# Patient Record
Sex: MALE | Race: OTHER | Employment: OTHER
[De-identification: names, ages, dates, MRNs, and addresses within clinical notes are randomized per-mention and may not be internally consistent; named-entity substitution may affect disease eponyms.]

---

## 2024-03-19 ENCOUNTER — HOSPITAL ENCOUNTER (OUTPATIENT)
Facility: HOSPITAL | Age: 69
Discharge: HOME | End: 2024-03-19
Payer: MEDICARE

## 2024-03-19 VITALS
TEMPERATURE: 98 F | RESPIRATION RATE: 16 BRPM | DIASTOLIC BLOOD PRESSURE: 79 MMHG | SYSTOLIC BLOOD PRESSURE: 161 MMHG | HEART RATE: 74 BPM | BODY MASS INDEX: 27.13 KG/M2 | WEIGHT: 179 LBS | HEIGHT: 68 IN

## 2024-03-19 DIAGNOSIS — L97.522 ULCER OF LEFT FOOT, WITH FAT LAYER EXPOSED (HCC): Primary | ICD-10-CM

## 2024-03-19 PROCEDURE — 11042 DBRDMT SUBQ TIS 1ST 20SQCM/<: CPT

## 2024-03-19 PROCEDURE — 99203 OFFICE O/P NEW LOW 30 MIN: CPT

## 2024-03-19 RX ORDER — METOPROLOL TARTRATE 50 MG/1
50 TABLET, FILM COATED ORAL 2 TIMES DAILY
COMMUNITY

## 2024-03-19 RX ORDER — CLOBETASOL PROPIONATE 0.5 MG/G
OINTMENT TOPICAL ONCE
OUTPATIENT
Start: 2024-03-19 | End: 2024-03-19

## 2024-03-19 RX ORDER — ATORVASTATIN CALCIUM 40 MG/1
40 TABLET, FILM COATED ORAL NIGHTLY
COMMUNITY

## 2024-03-19 RX ORDER — LIDOCAINE HYDROCHLORIDE 20 MG/ML
JELLY TOPICAL ONCE
OUTPATIENT
Start: 2024-03-19 | End: 2024-03-19

## 2024-03-19 RX ORDER — TRIAMCINOLONE ACETONIDE 1 MG/G
OINTMENT TOPICAL ONCE
OUTPATIENT
Start: 2024-03-19 | End: 2024-03-19

## 2024-03-19 RX ORDER — GENTAMICIN SULFATE 1 MG/G
OINTMENT TOPICAL ONCE
OUTPATIENT
Start: 2024-03-19 | End: 2024-03-19

## 2024-03-19 RX ORDER — BETAMETHASONE DIPROPIONATE 0.5 MG/G
CREAM TOPICAL ONCE
OUTPATIENT
Start: 2024-03-19 | End: 2024-03-19

## 2024-03-19 RX ORDER — SODIUM CHLOR/HYPOCHLOROUS ACID 0.033 %
SOLUTION, IRRIGATION IRRIGATION ONCE
OUTPATIENT
Start: 2024-03-19 | End: 2024-03-19

## 2024-03-19 RX ORDER — LIDOCAINE 50 MG/G
OINTMENT TOPICAL ONCE
OUTPATIENT
Start: 2024-03-19 | End: 2024-03-19

## 2024-03-19 RX ORDER — LIDOCAINE HYDROCHLORIDE 40 MG/ML
SOLUTION TOPICAL ONCE
OUTPATIENT
Start: 2024-03-19 | End: 2024-03-19

## 2024-03-19 RX ORDER — POTASSIUM CHLORIDE 1.5 G/1.58G
20 POWDER, FOR SOLUTION ORAL DAILY
COMMUNITY

## 2024-03-19 RX ORDER — LISINOPRIL AND HYDROCHLOROTHIAZIDE 25; 20 MG/1; MG/1
1 TABLET ORAL DAILY
COMMUNITY

## 2024-03-19 RX ORDER — BACITRACIN ZINC AND POLYMYXIN B SULFATE 500; 1000 [USP'U]/G; [USP'U]/G
OINTMENT TOPICAL ONCE
OUTPATIENT
Start: 2024-03-19 | End: 2024-03-19

## 2024-03-19 RX ORDER — LIDOCAINE 40 MG/G
CREAM TOPICAL ONCE
OUTPATIENT
Start: 2024-03-19 | End: 2024-03-19

## 2024-03-19 RX ORDER — GINSENG 100 MG
CAPSULE ORAL ONCE
OUTPATIENT
Start: 2024-03-19 | End: 2024-03-19

## 2024-03-19 RX ORDER — IBUPROFEN 200 MG
TABLET ORAL ONCE
OUTPATIENT
Start: 2024-03-19 | End: 2024-03-19

## 2024-03-19 NOTE — FLOWSHEET NOTE
03/19/24 0914   Anesthetic   Anesthetic 4% Lidocaine Liquid Topical   Right Leg Edema Point of Measurement   Leg circumference 33.7 cm   Ankle circumference 23.5 cm   Left Leg Edema Point of Measurement   Leg circumference 34.5 cm   Ankle circumference 24 cm   RLE Neurovascular Assessment   Capillary Refill Less than/Equal to 3 seconds   Color Appropriate for Ethnicity   Temperature Warm   RLE Sensation  Full sensation   R Pedal Pulse +2   LLE Neurovascular Assessment   Capillary Refill Less than/Equal to 3 seconds   Color Appropriate for Ethnicity   Temperature Warm   LLE Sensation  Full sensation   L Pedal Pulse +2   Ankle-Brachial Index   Right Arm Systolic Pressure 161 mmHg   Left Arm Systolic Pressure 0 mmHg   Right Ankle Systolic Pressure: Posterior Tibial (PT) 0 mmHg   Left Ankle Systolic Pressure: Posterior Tibial (PT) 0 mmHg   Left Ankle Systolic Pressure: Dorsalis Pedis (DP) 102 mmHg   Left RITESH 0.63 mmHg   Wound 03/19/24 Toe (Comment  which one) Left;Plantar #1   Date First Assessed/Time First Assessed: 03/19/24 0910   Present on Original Admission: Yes  Wound Approximate Age at First Assessment (Weeks): (c)   Primary Wound Type: Other (comment)  Location: (c) Toe (Comment  which one)  Wound Location Orientati...   Wound Image    Wound Etiology Other   Wound Cleansed Cleansed with saline   Wound Length (cm) 2.1 cm   Wound Width (cm) 1.7 cm   Wound Depth (cm) 0.2 cm   Wound Surface Area (cm^2) 3.57 cm^2   Wound Volume (cm^3) 0.714 cm^3   Wound Assessment Slough;Pink/red   Drainage Amount Moderate (25-50%)   Drainage Description Serosanguinous   Odor None   Yenny-wound Assessment Maceration   Margins Flat/open edges;Undefined edges   Wound Thickness Description not for Pressure Injury Full thickness     BP (!) 161/79   Pulse 74   Temp 98 °F (36.7 °C) (Temporal)   Resp 16   Ht 1.727 m (5' 8\")   Wt 81.2 kg (179 lb)   BMI 27.22 kg/m²

## 2024-03-19 NOTE — FLOWSHEET NOTE
03/19/24 0959   Wound 03/19/24 Toe (Comment  which one) Left;Plantar #1   Date First Assessed/Time First Assessed: 03/19/24 0910   Present on Original Admission: Yes  Wound Approximate Age at First Assessment (Weeks): (c)   Primary Wound Type: Other (comment)  Location: (c) Toe (Comment  which one)  Wound Location Orientati...   Dressing Status New dressing applied   Dressing/Treatment Collagen with Ag;Alginate with Ag;Gauze dressing/dressing sponge;Tape/Soft cloth adhesive tape  (Betadine)   Offloading for Diabetic Foot Ulcers Offloading ordered     Discharge Condition: Stable    Pain: 0    Ambulatory Status: Wheelchair    Discharge Destination: home    Transportation:car    Accompanied by: FAMILY    Discharge instructions reviewed with FAMILY and copy or written instructions have been provided. All questions/concerns have been addressed at this time.

## 2024-03-19 NOTE — WOUND CARE
Wound Center  History and Physical    Subjective:     Chief Complaint:  Yunior Clement is a 69 male who presents with Lt. foot lateral wound of  months duration.    Referred by:  Dr. Head    HPI:   Wound caused by: chronic pressure/irritation due to neuropathy?  Current wound care:  Offloading wound: no  Appetite: good  Wound associated pain: none  Diabetic: -  Smoker: -  ROS: no N/V, no T/chills; no local rash  Past Medical History:   Diagnosis Date    Cerebral artery occlusion with cerebral infarction (HCC)     Hypertension       History reviewed. No pertinent surgical history.  Family History   Problem Relation Age of Onset    Heart Failure Mother       Social History     Tobacco Use    Smoking status: Never    Smokeless tobacco: Never   Substance Use Topics    Alcohol use: Yes     Comment: social       Prior to Admission medications    Medication Sig Start Date End Date Taking? Authorizing Provider   potassium chloride (KLOR-CON) 20 MEQ packet Take 20 mEq by mouth daily   Yes Isabella Riggins MD   lisinopril-hydroCHLOROthiazide (PRINZIDE;ZESTORETIC) 20-25 MG per tablet Take 1 tablet by mouth daily   Yes Isabella Riggins MD   metoprolol tartrate (LOPRESSOR) 50 MG tablet Take 1 tablet by mouth 2 times daily   Yes Isabella Riggins MD   atorvastatin (LIPITOR) 40 MG tablet Take 1 tablet by mouth nightly   Yes ProviderIsabella MD     No Known Allergies     Review of Systems:  A comprehensive review of systems was negative except for that written in the History of Present Illness.     Objective:     Physical Exam:      General: well developed, well nourished, pleasant , NAD. Hygiene good  Psych: cooperative. Pleasant. No anxiety or depression. Normal mood and affect.  Neuro: alert and oriented to person/place/situation. Otherwise nonfocal.  HEENT: Normocephalic, atraumatic. EOMI. Conjunctiva clear. No scleral icterus.  Neck: Normal range of motion. No masses.  Chest: Good air entry bilaterally.

## 2024-03-19 NOTE — PATIENT INSTRUCTIONS
Cynthia vez por semana  [ ] No cambie el vendaje  [ ] Otro:      Cambio de arcenio en el hogar:  [ ] A diario  [ ] Día por medio  [x] Vincenzo veces por semana  [ ] Cynthia vez por semana  [ ] No cambie el vendaje  [ ] Otro:      Descarga: [x] Zapato quirúrgico [ ] Botas Podus [ ] Botas de espuma [ ] Rodilla scooter [ ] Bota de yeso [ ] Bota CROW [ ] Otro:    [ ] Total sin carga de peso: [ ] Pierna derecha [ ] Pierna loganierda    [ ] Descargar cuando: [ ] caminar [ ] en la cama [ ] Sentarse       Dietético:  [x] Aumentar proteínas: ejemplos (carne, queso, huevos, yogur karyn, bebida proteica de primer nivel, pescado, nueces)  [ ] juvenil  [ ] Suplemento de bebida Protien  [] Suplementos recomendados:    Actividad:  Actividad tolerada:  [x] El paciente no tiene restricciones de actividad  [] Reposo en cama estricto:  [] Permanecer fuera del trabajo:  [ ] Regreso al trabajo con restricciones:  [ ] Puede regresar al trabajo completo:    Marva de regreso:    [x] Marva de regreso: con el Dr. Paresh Cano en 1 semana (s)    [ ] Visita de enfermera programada en día(s) o semana(s)      Wound Care Center Information: Should you experience any significant changes in your wound(s) or have questions about your wound care, please contact the [facility] at MONDAY-THURSDAY 8:00 am - 4:30 AND Friday 8:00 AM- 12:00 PM .  If you need help with your wound outside these hours and cannot wait until we are again available, contact your PCP or go to the hospital emergency room.     PLEASE NOTE: IF YOU ARE UNABLE TO OBTAIN WOUND SUPPLIES, CONTINUE TO USE THE SUPPLIES YOU HAVE AVAILABLE UNTIL YOU ARE ABLE TO REACH US. IT IS MOST IMPORTANT TO KEEP THE WOUND COVERED AT ALL TIMES.     Physician Signature:_______________________ Dr. Paresh Cano

## 2024-03-27 ENCOUNTER — HOSPITAL ENCOUNTER (OUTPATIENT)
Facility: HOSPITAL | Age: 69
Discharge: HOME | End: 2024-03-27
Payer: MEDICARE

## 2024-03-27 VITALS
SYSTOLIC BLOOD PRESSURE: 147 MMHG | RESPIRATION RATE: 18 BRPM | DIASTOLIC BLOOD PRESSURE: 69 MMHG | TEMPERATURE: 97.3 F | HEART RATE: 68 BPM

## 2024-03-27 PROCEDURE — 11042 DBRDMT SUBQ TIS 1ST 20SQCM/<: CPT

## 2024-03-27 RX ORDER — LIDOCAINE HYDROCHLORIDE 20 MG/ML
JELLY TOPICAL ONCE
OUTPATIENT
Start: 2024-03-27 | End: 2024-03-27

## 2024-03-27 RX ORDER — GENTAMICIN SULFATE 1 MG/G
OINTMENT TOPICAL ONCE
OUTPATIENT
Start: 2024-03-27 | End: 2024-03-27

## 2024-03-27 RX ORDER — IBUPROFEN 200 MG
TABLET ORAL ONCE
OUTPATIENT
Start: 2024-03-27 | End: 2024-03-27

## 2024-03-27 RX ORDER — LIDOCAINE HYDROCHLORIDE 40 MG/ML
SOLUTION TOPICAL ONCE
OUTPATIENT
Start: 2024-03-27 | End: 2024-03-27

## 2024-03-27 RX ORDER — TRIAMCINOLONE ACETONIDE 1 MG/G
OINTMENT TOPICAL ONCE
OUTPATIENT
Start: 2024-03-27 | End: 2024-03-27

## 2024-03-27 RX ORDER — BETAMETHASONE DIPROPIONATE 0.5 MG/G
CREAM TOPICAL ONCE
OUTPATIENT
Start: 2024-03-27 | End: 2024-03-27

## 2024-03-27 RX ORDER — BACITRACIN ZINC AND POLYMYXIN B SULFATE 500; 1000 [USP'U]/G; [USP'U]/G
OINTMENT TOPICAL ONCE
OUTPATIENT
Start: 2024-03-27 | End: 2024-03-27

## 2024-03-27 RX ORDER — CLOBETASOL PROPIONATE 0.5 MG/G
OINTMENT TOPICAL ONCE
OUTPATIENT
Start: 2024-03-27 | End: 2024-03-27

## 2024-03-27 RX ORDER — GINSENG 100 MG
CAPSULE ORAL ONCE
OUTPATIENT
Start: 2024-03-27 | End: 2024-03-27

## 2024-03-27 RX ORDER — LIDOCAINE 40 MG/G
CREAM TOPICAL ONCE
OUTPATIENT
Start: 2024-03-27 | End: 2024-03-27

## 2024-03-27 RX ORDER — LIDOCAINE 50 MG/G
OINTMENT TOPICAL ONCE
OUTPATIENT
Start: 2024-03-27 | End: 2024-03-27

## 2024-03-27 RX ORDER — SODIUM CHLOR/HYPOCHLOROUS ACID 0.033 %
SOLUTION, IRRIGATION IRRIGATION ONCE
OUTPATIENT
Start: 2024-03-27 | End: 2024-03-27

## 2024-03-27 NOTE — PROGRESS NOTES
03/27/24 0918   Wound Healing % 95 03/27/24 0918   Post-Procedure Length (cm) 0.9 cm 03/27/24 0945   Post-Procedure Width (cm) 0.4 cm 03/27/24 0945   Post-Procedure Depth (cm) 0.2 cm 03/27/24 0945   Post-Procedure Surface Area (cm^2) 0.36 cm^2 03/27/24 0945   Post-Procedure Volume (cm^3) 0.072 cm^3 03/27/24 0945   Wound Assessment Pink/red;Slough 03/27/24 0918   Drainage Amount Moderate (25-50%) 03/27/24 0945   Drainage Description Serosanguinous 03/27/24 0945   Odor None 03/27/24 0918   Yenny-wound Assessment Maceration;Hypopigmented 03/27/24 0918   Margins Flat/open edges 03/27/24 0918   Wound Thickness Description not for Pressure Injury Full thickness 03/27/24 0945   Number of days: 8               I have noted, and reviewed today's data for this patient in [facility] and concur with same. The focused physical exam, other physical findings, Medical history, Review of Symptoms and Medications today remains unchanged except as noted below.     Patient notes today: was seeing Dr. Cano but has had a scheduling conflict and needs to come on Wednesdays. No new trouble, wound is a little better. Gets PT at home 2-3 per week, .  Lesion/Wound, focused exam on Presentation today:   1 +pitting edema LLE, not RLE,;  RITESH of 0.6 +/- on R/L. Pulses tract to palpate.   L plantar. Ulcer at 4th  mp area now  filling with some skin ingrowth, easy bleeding in periphery with bruising.     Procedure:   Ulcer Type: pressure  Consent obtained: Yes  Time out taken: Yes    Wound,(s)# L foot.  Procedure name: sharp excisional debridement.  Anaesthesia: Lidocaine; topical    Description: using a sharp curette I excised all non viable tissue to effect a clean bleeding base. Using timolol I note good blood response  Tissue Level/depth of debridement: subq.  Post debridement dimensions changed as noted:    Depth, add 1.0 mm;    Width, add 0.0 mm   Length, add 0.0 mm.   Blood Loss: 2 CCs. Bleeding abated post treatment .   Post Procedure

## 2024-03-27 NOTE — PATIENT INSTRUCTIONS
Discharge Instructions for  [facility]  [address] [place] [address]  Telephone: [phone]   FAX [phone]    NAME:  Yunior Clement  YOB: 1955  ACCOUNT NUMBER :  [id number]  DATE:  3/27/2024       : Steve BERKOWITZ     HOME HEALTH: [facility]    Wound Cleansing:   Do not scrub or use excessive force.  Cleanse wound prior to applying a clean dressing with:      [x] Cleanse wound with: BABY SHAMPOO LATHER  LEAVE on 1-2 MINUTES ON WOUND THEN RINSE WITH WATER OR SALINE    [] May Shower at Discharge     [] Shower on days of dressing change, remove dressing first    [] Keep Wound Dry in Shower (may purchase a cast cover if needed)     Topical Treatments:  Do not apply lotions, creams, or ointments to wound bed unless directed.     [x] Apply moisturizing lotion A&D ointment  to skin surrounding the wound prior to dressing change.  [] Apply antifungal ointment to skin surrounding the wound prior to dressing change.  [] Apply thin film of Zinc barrier ointment to skin immediately around wound.  [x] Other: Timolol 1 drop to wound base, let it sit in wound for 1-2 after cleansing and before application of wound dressing      Dressings:           Wound Location Left plantar foot   Apply Primary Dressing:       [x] Collagen with Silver (1st)       [x] Alginate with Silver (2nd)        [] Other:        Cover and Secure with:    [x] Gauze   [] Arti   [] Kerlix  [] Ace Wrap     [] ABD  [x] Medipore tape  [] tape  [] Other:    Avoid contact of tape with skin.    Change dressing:   [] Daily      [] Every Other Day   [x] Three times per week  [] Once a week   [] Do Not Change Dressing     [] Other:      Home Health change  :   [] Daily      [] Every Other Day   [x] Three times per week  [] Once a week   [] Do Not Change Dressing     [] Other:      Off-Loading: [x] Surgical shoe    [] Podus Boot(s)   [] Foam Boot(s)  [] Knee scooter [] Cast Boot [] CROW Boot  []

## 2024-03-27 NOTE — FLOWSHEET NOTE
03/27/24 0918   Anesthetic   Anesthetic 4% Lidocaine Liquid Topical   Left Leg Edema Point of Measurement   Leg circumference 32.8 cm   Ankle circumference 22 cm   LLE Neurovascular Assessment   Capillary Refill Less than/Equal to 3 seconds   Temperature Warm   L Pedal Pulse +2   Wound 03/19/24 Toe (Comment  which one) Left;Plantar #1   Date First Assessed/Time First Assessed: 03/19/24 0910   Present on Original Admission: Yes  Wound Approximate Age at First Assessment (Weeks): (c)   Primary Wound Type: Other (comment)  Location: (c) Toe (Comment  which one)  Wound Location Orientati...   Wound Image    Wound Length (cm) 0.9 cm   Wound Width (cm) 0.4 cm   Wound Depth (cm) 0.1 cm   Wound Surface Area (cm^2) 0.36 cm^2   Change in Wound Size % (l*w) 89.92   Wound Volume (cm^3) 0.036 cm^3   Wound Healing % 95   Wound Assessment [place]/red;Slough   Drainage Amount Small (< 25%)   Drainage Description Serosanguinous   Odor None   Yenny-wound Assessment Maceration;Hypopigmented   Margins Flat/open edges     BP (!) 147/69   Pulse 68   Temp 97.3 °F (36.3 °C) (Temporal)   Resp 18

## 2024-03-27 NOTE — FLOWSHEET NOTE
03/27/24 1002   Wound 03/19/24 Toe (Comment  which one) Left;Plantar #1   Date First Assessed/Time First Assessed: 03/19/24 0910   Present on Original Admission: Yes  Wound Approximate Age at First Assessment (Weeks): (c)   Primary Wound Type: Other (comment)  Location: (c) Toe (Comment  which one)  Wound Location Orientati...   Dressing/Treatment Collagen with Ag;Alginate with Ag;Gauze dressing/dressing sponge;Tape/Soft cloth adhesive tape;Other (comment)  (Timolol drops)     Discharge Condition: Stable    Pain: 0    Ambulatory Status: Wheelchair    Discharge Destination: home    Transportation:car    Accompanied by: FAMILY    Discharge instructions reviewed with FAMILY and self and copy or written instructions have been provided. All questions/concerns have been addressed at this time.

## 2024-03-28 NOTE — WOUND CARE
Spoke to Erin at [facility] and requested most recent labs and chart notes to be faxed to wound care clinic.

## 2024-04-03 ENCOUNTER — HOSPITAL ENCOUNTER (OUTPATIENT)
Facility: HOSPITAL | Age: 69
Discharge: HOME | End: 2024-04-03
Payer: MEDICARE

## 2024-04-03 VITALS
HEART RATE: 69 BPM | TEMPERATURE: 97.5 F | DIASTOLIC BLOOD PRESSURE: 76 MMHG | SYSTOLIC BLOOD PRESSURE: 132 MMHG | RESPIRATION RATE: 16 BRPM

## 2024-04-03 DIAGNOSIS — L97.522 ULCER OF LEFT FOOT, WITH FAT LAYER EXPOSED (HCC): Primary | ICD-10-CM

## 2024-04-03 PROCEDURE — 11042 DBRDMT SUBQ TIS 1ST 20SQCM/<: CPT

## 2024-04-03 RX ORDER — LIDOCAINE HYDROCHLORIDE 20 MG/ML
JELLY TOPICAL ONCE
OUTPATIENT
Start: 2024-04-03 | End: 2024-04-03

## 2024-04-03 RX ORDER — LIDOCAINE 40 MG/G
CREAM TOPICAL ONCE
OUTPATIENT
Start: 2024-04-03 | End: 2024-04-03

## 2024-04-03 RX ORDER — GENTAMICIN SULFATE 1 MG/G
OINTMENT TOPICAL ONCE
OUTPATIENT
Start: 2024-04-03 | End: 2024-04-03

## 2024-04-03 RX ORDER — IBUPROFEN 200 MG
TABLET ORAL ONCE
OUTPATIENT
Start: 2024-04-03 | End: 2024-04-03

## 2024-04-03 RX ORDER — BETAMETHASONE DIPROPIONATE 0.5 MG/G
CREAM TOPICAL ONCE
OUTPATIENT
Start: 2024-04-03 | End: 2024-04-03

## 2024-04-03 RX ORDER — BACITRACIN ZINC AND POLYMYXIN B SULFATE 500; 1000 [USP'U]/G; [USP'U]/G
OINTMENT TOPICAL ONCE
OUTPATIENT
Start: 2024-04-03 | End: 2024-04-03

## 2024-04-03 RX ORDER — SODIUM CHLOR/HYPOCHLOROUS ACID 0.033 %
SOLUTION, IRRIGATION IRRIGATION ONCE
OUTPATIENT
Start: 2024-04-03 | End: 2024-04-03

## 2024-04-03 RX ORDER — CLOBETASOL PROPIONATE 0.5 MG/G
OINTMENT TOPICAL ONCE
OUTPATIENT
Start: 2024-04-03 | End: 2024-04-03

## 2024-04-03 RX ORDER — GINSENG 100 MG
CAPSULE ORAL ONCE
OUTPATIENT
Start: 2024-04-03 | End: 2024-04-03

## 2024-04-03 RX ORDER — LIDOCAINE HYDROCHLORIDE 40 MG/ML
SOLUTION TOPICAL ONCE
OUTPATIENT
Start: 2024-04-03 | End: 2024-04-03

## 2024-04-03 RX ORDER — TRIAMCINOLONE ACETONIDE 1 MG/G
OINTMENT TOPICAL ONCE
OUTPATIENT
Start: 2024-04-03 | End: 2024-04-03

## 2024-04-03 RX ORDER — LIDOCAINE 50 MG/G
OINTMENT TOPICAL ONCE
OUTPATIENT
Start: 2024-04-03 | End: 2024-04-03

## 2024-04-03 NOTE — FLOWSHEET NOTE
04/03/24 1102   Wound 03/19/24 Toe (Comment  which one) Left;Plantar #1   Date First Assessed/Time First Assessed: 03/19/24 0910   Present on Original Admission: Yes  Wound Approximate Age at First Assessment (Weeks): (c)   Primary Wound Type: Other (comment)  Location: (c) Toe (Comment  which one)  Wound Location Orientati...   Dressing/Treatment Alginate with Ag;Gauze dressing/dressing sponge;Tape/Soft cloth adhesive tape   Offloading for Diabetic Foot Ulcers Offloading ordered     Discharge Condition: Stable    Pain: 0    Ambulatory Status:Wheelchair    Discharge Destination: Home    Transportation:Car    Accompanied by: Family    Discharge instructions reviewed with Family and copy or written instructions have been provided. All questions/concerns have been addressed at this time.

## 2024-04-03 NOTE — FLOWSHEET NOTE
04/03/24 1009   Anesthetic   Anesthetic 4% Lidocaine Liquid Topical   Left Leg Edema Point of Measurement   Leg circumference 32.6 cm   Ankle circumference 22.7 cm   LLE Neurovascular Assessment   Capillary Refill Greater than 3 seconds   Color Appropriate for Ethnicity   Temperature Warm   L Pedal Pulse +1   Wound 03/19/24 Toe (Comment  which one) Left;Plantar #1   Date First Assessed/Time First Assessed: 03/19/24 0910   Present on Original Admission: Yes  Wound Approximate Age at First Assessment (Weeks): (c)   Primary Wound Type: Other (comment)  Location: (c) Toe (Comment  which one)  Wound Location Orientati...   Wound Image    Wound Cleansed Cleansed with saline   Offloading for Diabetic Foot Ulcers Offloading ordered   Wound Length (cm) 2 cm   Wound Width (cm) 1.3 cm   Wound Depth (cm) 0.1 cm   Wound Surface Area (cm^2) 2.6 cm^2   Change in Wound Size % (l*w) 27.17   Wound Volume (cm^3) 0.26 cm^3   Wound Healing % 64   Wound Assessment Pink/red   Drainage Amount Moderate (25-50%)   Drainage Description Serosanguinous   Odor None   Yenny-wound Assessment Maceration;Hyperkeratosis (callous)   Margins Undefined edges;Flat/open edges   Wound Thickness Description not for Pressure Injury Full thickness     /76   Pulse 69   Temp 97.5 °F (36.4 °C) (Temporal)   Resp 16

## 2024-04-03 NOTE — PROGRESS NOTES
[facility]   Progress Note and Procedure Note   NO Procedure    Patient Name: Yunior Clement  : 1955  MRN: [id number]    Past Medical History:   Diagnosis Date    Cerebral artery occlusion with cerebral infarction (HCC)     Hypertension      No past surgical history on file.  Family History   Problem Relation Age of Onset    Heart Failure Mother      Social History     Tobacco Use    Smoking status: Never    Smokeless tobacco: Never   Substance Use Topics    Alcohol use: Yes     Comment: social    Drug use: Never     No Known Allergies  Current Outpatient Medications on File Prior to Encounter   Medication Sig Dispense Refill    potassium chloride (KLOR-CON) 20 MEQ packet Take 20 mEq by mouth daily      lisinopril-hydroCHLOROthiazide (PRINZIDE;ZESTORETIC) 20-25 MG per tablet Take 1 tablet by mouth daily      metoprolol tartrate (LOPRESSOR) 50 MG tablet Take 1 tablet by mouth 2 times daily      atorvastatin (LIPITOR) 40 MG tablet Take 1 tablet by mouth nightly       No current facility-administered medications on file prior to encounter.     No results found for: \"LABA1C\"      Vitals:    24 1012   BP: 132/76   Pulse: 69   Resp: 16   Temp: 97.5 °F (36.4 °C)      Wound 24 Toe (Comment  which one) Left;Plantar #1 (Active)   Wound Image   24 1009   Wound Etiology Pressure Unstageable 24 0923   Dressing Status New dressing applied 24 0959   Wound Cleansed Cleansed with saline 24 1009   Dressing/Treatment Alginate with Ag;Gauze dressing/dressing sponge;Tape/Soft cloth adhesive tape 24 1102   Offloading for Diabetic Foot Ulcers Offloading ordered 24 1102   Wound Length (cm) 2 cm 24 1009   Wound Width (cm) 1.3 cm 24 1009   Wound Depth (cm) 0.1 cm 24 1009   Wound Surface Area (cm^2) 2.6 cm^2 24 1009   Change in Wound Size % (l*w) 27.17 24 1009   Wound Volume (cm^3) 0.26 cm^3 24 1009   Wound Healing % 64 24

## 2024-04-03 NOTE — PATIENT INSTRUCTIONS
Discharge Instructions for  [facility]  [address] [place] [address]  Telephone: [phone]   FAX [phone]    NAME:  Yunior Clement  YOB: 1955  ACCOUNT NUMBER :  [id number]  DATE:  4/3/2024       : Steve BERKOWITZ     HOME HEALTH: [facility]    Wound Cleansing:   Do not scrub or use excessive force.  Cleanse wound prior to applying a clean dressing with:      [x] Cleanse wound with: BABY SHAMPOO LATHER  LEAVE on 1-2 MINUTES ON WOUND THEN RINSE WITH WATER OR SALINE    [] May Shower at Discharge     [] Shower on days of dressing change, remove dressing first    [] Keep Wound Dry in Shower (may purchase a cast cover if needed)     Topical Treatments:  Do not apply lotions, creams, or ointments to wound bed unless directed.     [x] Apply moisturizing lotion A&D ointment  to skin surrounding the wound prior to dressing change.  [] Apply antifungal ointment to skin surrounding the wound prior to dressing change.  [] Apply thin film of Zinc barrier ointment to skin immediately around wound.  [] Other:    Dressings:           Wound Location Left plantar foot   Apply Primary Dressing:      [x] Alginate with Silver         [] Other:        Cover and Secure with:    [x] Gauze   [] Arti   [] Kerlix  [] Ace Wrap     [] ABD  [x] Medipore tape  [] tape  [] Other:    Avoid contact of tape with skin.    Change dressing:   [] Daily      [] Every Other Day   [x] Three times per week  [] Once a week   [] Do Not Change Dressing     [] Other:      Home Health change  :   [] Daily      [] Every Other Day   [x] Three times per week  [] Once a week   [] Do Not Change Dressing     [] Other:      Off-Loading: [x] Surgical shoe (wound clinic to add felt to surgical shoe for offloading)   [] Podus Boot(s)   [] Foam Boot(s)  [] Knee scooter [] Cast Boot [] CROW Boot  [] Other:    [] Total non-weight bearing : [] Right Leg     [] Left Leg    [] Off-loading when : [] walking  [] in

## 2024-04-17 ENCOUNTER — HOSPITAL ENCOUNTER (OUTPATIENT)
Facility: HOSPITAL | Age: 69
Discharge: HOME | End: 2024-04-17
Payer: MEDICARE

## 2024-04-17 VITALS
SYSTOLIC BLOOD PRESSURE: 157 MMHG | RESPIRATION RATE: 16 BRPM | HEART RATE: 67 BPM | DIASTOLIC BLOOD PRESSURE: 83 MMHG | TEMPERATURE: 98.1 F

## 2024-04-17 DIAGNOSIS — L97.522 ULCER OF LEFT FOOT, WITH FAT LAYER EXPOSED (HCC): Primary | ICD-10-CM

## 2024-04-17 PROCEDURE — 11042 DBRDMT SUBQ TIS 1ST 20SQCM/<: CPT

## 2024-04-17 RX ORDER — SODIUM CHLOR/HYPOCHLOROUS ACID 0.033 %
SOLUTION, IRRIGATION IRRIGATION ONCE
OUTPATIENT
Start: 2024-04-17 | End: 2024-04-17

## 2024-04-17 RX ORDER — TRIAMCINOLONE ACETONIDE 1 MG/G
OINTMENT TOPICAL ONCE
OUTPATIENT
Start: 2024-04-17 | End: 2024-04-17

## 2024-04-17 RX ORDER — BETAMETHASONE DIPROPIONATE 0.5 MG/G
CREAM TOPICAL ONCE
OUTPATIENT
Start: 2024-04-17 | End: 2024-04-17

## 2024-04-17 RX ORDER — GENTAMICIN SULFATE 1 MG/G
OINTMENT TOPICAL ONCE
OUTPATIENT
Start: 2024-04-17 | End: 2024-04-17

## 2024-04-17 RX ORDER — LIDOCAINE 50 MG/G
OINTMENT TOPICAL ONCE
OUTPATIENT
Start: 2024-04-17 | End: 2024-04-17

## 2024-04-17 RX ORDER — IBUPROFEN 200 MG
TABLET ORAL ONCE
OUTPATIENT
Start: 2024-04-17 | End: 2024-04-17

## 2024-04-17 RX ORDER — LIDOCAINE HYDROCHLORIDE 20 MG/ML
JELLY TOPICAL ONCE
OUTPATIENT
Start: 2024-04-17 | End: 2024-04-17

## 2024-04-17 RX ORDER — BACITRACIN ZINC AND POLYMYXIN B SULFATE 500; 1000 [USP'U]/G; [USP'U]/G
OINTMENT TOPICAL ONCE
OUTPATIENT
Start: 2024-04-17 | End: 2024-04-17

## 2024-04-17 RX ORDER — GINSENG 100 MG
CAPSULE ORAL ONCE
OUTPATIENT
Start: 2024-04-17 | End: 2024-04-17

## 2024-04-17 RX ORDER — LIDOCAINE HYDROCHLORIDE 40 MG/ML
SOLUTION TOPICAL ONCE
OUTPATIENT
Start: 2024-04-17 | End: 2024-04-17

## 2024-04-17 RX ORDER — LIDOCAINE 40 MG/G
CREAM TOPICAL ONCE
OUTPATIENT
Start: 2024-04-17 | End: 2024-04-17

## 2024-04-17 RX ORDER — CLOBETASOL PROPIONATE 0.5 MG/G
OINTMENT TOPICAL ONCE
OUTPATIENT
Start: 2024-04-17 | End: 2024-04-17

## 2024-04-17 NOTE — FLOWSHEET NOTE
04/17/24 1045   Wound 03/19/24 Toe (Comment  which one) Left;Plantar #1   Date First Assessed/Time First Assessed: 03/19/24 0910   Present on Original Admission: Yes  Wound Approximate Age at First Assessment (Weeks): (c)   Primary Wound Type: Other (comment)  Location: (c) Toe (Comment  which one)  Wound Location Orientati...   Dressing/Treatment Cutimed/Sorbact;Alginate with Ag;Gauze dressing/dressing sponge;Tape/Soft cloth adhesive tape     Discharge Condition: Stable    Pain: 0    Ambulatory Status:Wheelchair    Discharge Destination: Home    Transportation:Car    Accompanied by: Self and Family    Discharge instructions reviewed with Self and Family and copy or written instructions have been provided. All questions/concerns have been addressed at this time.

## 2024-04-17 NOTE — PROGRESS NOTES
[facility]   Progress Note and Procedure Note   NO Procedure    Patient Name: Yunior Clement  : 1955  MRN: [id number]    Past Medical History:   Diagnosis Date    Cerebral artery occlusion with cerebral infarction (HCC)     Hypertension      History reviewed. No pertinent surgical history.  Family History   Problem Relation Age of Onset    Heart Failure Mother      Social History     Tobacco Use    Smoking status: Never    Smokeless tobacco: Never   Substance Use Topics    Alcohol use: Yes     Comment: social    Drug use: Never     No Known Allergies  Current Outpatient Medications on File Prior to Encounter   Medication Sig Dispense Refill    potassium chloride (KLOR-CON) 20 MEQ packet Take 20 mEq by mouth daily      lisinopril-hydroCHLOROthiazide (PRINZIDE;ZESTORETIC) 20-25 MG per tablet Take 1 tablet by mouth daily      metoprolol tartrate (LOPRESSOR) 50 MG tablet Take 1 tablet by mouth 2 times daily      atorvastatin (LIPITOR) 40 MG tablet Take 1 tablet by mouth nightly       No current facility-administered medications on file prior to encounter.     No results found for: \"LABA1C\"      Vitals:    24 1016   BP: (!) 157/83   Pulse: 67   Resp: 16   Temp: 98.1 °F (36.7 °C)      Wound 24 Toe (Comment  which one) Left;Plantar #1 (Active)   Wound Image   24 1016   Wound Etiology Pressure Unstageable 24 0923   Dressing Status New dressing applied 24 0959   Wound Cleansed Cleansed with saline 24 1016   Dressing/Treatment Cutimed/Sorbact;Alginate with Ag;Gauze dressing/dressing sponge;Tape/Soft cloth adhesive tape 24 1045   Offloading for Diabetic Foot Ulcers Offloading ordered 24 1102   Wound Length (cm) 0.6 cm 24 1016   Wound Width (cm) 0.9 cm 24 1016   Wound Depth (cm) 0.1 cm 24 1016   Wound Surface Area (cm^2) 0.54 cm^2 24 1016   Change in Wound Size % (l*w) 84.87 24 1016   Wound Volume (cm^3) 0.054 cm^3

## 2024-04-17 NOTE — FLOWSHEET NOTE
04/17/24 1016   Anesthetic   Anesthetic 4% Lidocaine Liquid Topical   Left Leg Edema Point of Measurement   Leg circumference 30.5 cm   Ankle circumference 23.5 cm   LLE Neurovascular Assessment   Capillary Refill Less than/Equal to 3 seconds   Color Appropriate for Ethnicity   Temperature Warm   L Pedal Pulse +1   Wound 03/19/24 Toe (Comment  which one) Left;Plantar #1   Date First Assessed/Time First Assessed: 03/19/24 0910   Present on Original Admission: Yes  Wound Approximate Age at First Assessment (Weeks): (c)   Primary Wound Type: Other (comment)  Location: (c) Toe (Comment  which one)  Wound Location Orientati...   Wound Image    Wound Cleansed Cleansed with saline   Wound Length (cm) 0.6 cm  (clustered)   Wound Width (cm) 0.9 cm  (clustered)   Wound Depth (cm) 0.1 cm   Wound Surface Area (cm^2) 0.54 cm^2   Change in Wound Size % (l*w) 84.87   Wound Volume (cm^3) 0.054 cm^3   Wound Healing % 92   Wound Assessment Slough;Pink/red;Epithelialization   Drainage Amount Moderate (25-50%)   Drainage Description Serosanguinous   Odor None   Yenny-wound Assessment Other (Comment);Dry/flaky   Margins Flat/open edges   Wound Thickness Description not for Pressure Injury Partial thickness   Pain Assessment   Pain Assessment None - Denies Pain     BP (!) 157/83   Pulse 67   Temp 98.1 °F (36.7 °C) (Temporal)   Resp 16

## 2024-04-17 NOTE — PATIENT INSTRUCTIONS
Discharge Instructions for  [facility]  [address] [place] [address]  Telephone: [phone]   FAX [phone]    NAME:  Yunior Clement  YOB: 1955  ACCOUNT NUMBER :  [id number]  DATE:  4/17/2024       : Steve BERKOWITZ     HOME HEALTH: [facility]    Wound Cleansing:   Do not scrub or use excessive force.  Cleanse wound prior to applying a clean dressing with:      [x] Cleanse wound with: BABY SHAMPOO LATHER  LEAVE on 1-2 MINUTES ON WOUND THEN RINSE WITH WATER OR SALINE    [] May Shower at Discharge     [] Shower on days of dressing change, remove dressing first    [] Keep Wound Dry in Shower (may purchase a cast cover if needed)     Topical Treatments:  Do not apply lotions, creams, or ointments to wound bed unless directed.     [x] Apply moisturizing lotion A&D ointment  to skin surrounding the wound prior to dressing change.  [] Apply antifungal ointment to skin surrounding the wound prior to dressing change.  [] Apply thin film of Zinc barrier ointment to skin immediately around wound.  [x] Other: Timolol 1-2 drops to wound base after cleansing and let sit 1 minute then apply dressing      Dressings:           Wound Location Left plantar foot   Apply Primary Dressing:      [x] Sorbact to wound base then cover with Alginate with Silver         [] Other:        Cover and Secure with:    [x] Gauze   [] Arti   [] Kerlix  [] Ace Wrap     [] ABD  [x] Medipore tape  [] tape  [] Other:    Avoid contact of tape with skin.    Change dressing:   [] Daily      [] Every Other Day   [x] Three times per week  [] Once a week   [] Do Not Change Dressing     [] Other:      Home Health change  :   [] Daily      [] Every Other Day   [x] Three times per week  [] Once a week   [] Do Not Change Dressing     [] Other:      Off-Loading: [x] Surgical shoe (wound clinic to add felt to surgical shoe for offloading)   [] Podus Boot(s)   [] Foam Boot(s)  [] Knee scooter [] Cast

## 2024-05-01 ENCOUNTER — HOSPITAL ENCOUNTER (OUTPATIENT)
Facility: HOSPITAL | Age: 69
Discharge: HOME | End: 2024-05-01
Payer: MEDICARE

## 2024-05-01 VITALS
SYSTOLIC BLOOD PRESSURE: 134 MMHG | DIASTOLIC BLOOD PRESSURE: 72 MMHG | HEART RATE: 67 BPM | TEMPERATURE: 98 F | RESPIRATION RATE: 15 BRPM

## 2024-05-01 DIAGNOSIS — L97.522 ULCER OF LEFT FOOT, WITH FAT LAYER EXPOSED (HCC): Primary | ICD-10-CM

## 2024-05-01 PROCEDURE — 11042 DBRDMT SUBQ TIS 1ST 20SQCM/<: CPT

## 2024-05-01 RX ORDER — CLOBETASOL PROPIONATE 0.5 MG/G
OINTMENT TOPICAL ONCE
OUTPATIENT
Start: 2024-05-01 | End: 2024-05-01

## 2024-05-01 RX ORDER — BACITRACIN ZINC AND POLYMYXIN B SULFATE 500; 1000 [USP'U]/G; [USP'U]/G
OINTMENT TOPICAL ONCE
OUTPATIENT
Start: 2024-05-01 | End: 2024-05-01

## 2024-05-01 RX ORDER — TRIAMCINOLONE ACETONIDE 1 MG/G
OINTMENT TOPICAL ONCE
OUTPATIENT
Start: 2024-05-01 | End: 2024-05-01

## 2024-05-01 RX ORDER — IBUPROFEN 200 MG
TABLET ORAL ONCE
OUTPATIENT
Start: 2024-05-01 | End: 2024-05-01

## 2024-05-01 RX ORDER — LIDOCAINE HYDROCHLORIDE 20 MG/ML
JELLY TOPICAL ONCE
OUTPATIENT
Start: 2024-05-01 | End: 2024-05-01

## 2024-05-01 RX ORDER — GINSENG 100 MG
CAPSULE ORAL ONCE
OUTPATIENT
Start: 2024-05-01 | End: 2024-05-01

## 2024-05-01 RX ORDER — SODIUM CHLOR/HYPOCHLOROUS ACID 0.033 %
SOLUTION, IRRIGATION IRRIGATION ONCE
OUTPATIENT
Start: 2024-05-01 | End: 2024-05-01

## 2024-05-01 RX ORDER — GENTAMICIN SULFATE 1 MG/G
OINTMENT TOPICAL ONCE
OUTPATIENT
Start: 2024-05-01 | End: 2024-05-01

## 2024-05-01 RX ORDER — BETAMETHASONE DIPROPIONATE 0.5 MG/G
CREAM TOPICAL ONCE
OUTPATIENT
Start: 2024-05-01 | End: 2024-05-01

## 2024-05-01 RX ORDER — LIDOCAINE 50 MG/G
OINTMENT TOPICAL ONCE
OUTPATIENT
Start: 2024-05-01 | End: 2024-05-01

## 2024-05-01 RX ORDER — LIDOCAINE 40 MG/G
CREAM TOPICAL ONCE
OUTPATIENT
Start: 2024-05-01 | End: 2024-05-01

## 2024-05-01 RX ORDER — LIDOCAINE HYDROCHLORIDE 40 MG/ML
SOLUTION TOPICAL ONCE
OUTPATIENT
Start: 2024-05-01 | End: 2024-05-01

## 2024-05-01 NOTE — PATIENT INSTRUCTIONS
Discharge Instructions for  [facility]  [address] [place] [address]  Telephone: [phone]   FAX [phone]    NAME:  Yunior Clement  YOB: 1955  ACCOUNT NUMBER :  [id number]  DATE:  4/17/2024       : Steve BERKOWITZ     HOME HEALTH: [facility]    Referral: The physician has recommended vascular studies with Dr. iNcolas John's office:  Studies to be completed: Ankle Brachial Index Bilateral lower legs and Venous Reflux bilateral lower legs     Dr. John's office should reach out to patient within 24 to 48 hours with information on appointment time and date  If a call has not been made within 24 to 48 hours please contact Dr. John's office at [phone] (Fax:[phone])  Be prepared for possible wound dressing removal; if compression wrap is in place please make a nurse visit appointment with the wound care center [phone] or coordinate with home health agency to re-apply compression wrap after vascular visit.  Nicolas John  Physician  Provider Summary    Title Resident? Provider type   MD No Physician     Primary Contact Information    Phone Fax E-mail Address   [phone] Not available [id number] [facility] [address]       Specialties      General Surgery, Vascular Surgery                 Wound Cleansing:   Do not scrub or use excessive force.  Cleanse wound prior to applying a clean dressing with:      [x] Cleanse wound with: BABY SHAMPOO LATHER  LEAVE on 1-2 MINUTES ON WOUND THEN RINSE WITH WATER OR SALINE    [] May Shower at Discharge     [] Shower on days of dressing change, remove dressing first    [] Keep Wound Dry in Shower (may purchase a cast cover if needed)     Topical Treatments:  Do not apply lotions, creams, or ointments to wound bed unless directed.     [x] Apply moisturizing lotion A&D ointment  to skin surrounding the wound prior to dressing change.  [] Apply antifungal ointment to skin

## 2024-05-01 NOTE — FLOWSHEET NOTE
05/01/24 0955   Wound 03/19/24 Toe (Comment  which one) Left;Plantar #1   Date First Assessed/Time First Assessed: 03/19/24 0910   Present on Original Admission: Yes  Wound Approximate Age at First Assessment (Weeks): (c)   Primary Wound Type: Other (comment)  Location: (c) Toe (Comment  which one)  Wound Location Orientati...   Dressing/Treatment Alginate with Ag;Gauze dressing/dressing sponge;ABD;Tape/Soft cloth adhesive tape;Roll gauze   Offloading for Diabetic Foot Ulcers Offloading ordered        05/01/24 0955   Wound 03/19/24 Toe (Comment  which one) Left;Plantar #1   Date First Assessed/Time First Assessed: 03/19/24 0910   Present on Original Admission: Yes  Wound Approximate Age at First Assessment (Weeks): (c)   Primary Wound Type: Other (comment)  Location: (c) Toe (Comment  which one)  Wound Location Orientati...   Dressing/Treatment Alginate with Ag;Gauze dressing/dressing sponge;ABD;Tape/Soft cloth adhesive tape;Roll gauze   Offloading for Diabetic Foot Ulcers Offloading ordered     Discharge Condition: Stable    Pain: 0    Ambulatory Status:Wheelchair    Discharge Destination: Home    Transportation:Car    Accompanied by: Family    Discharge instructions reviewed with Family and copy or written instructions have been provided. All questions/concerns have been addressed at this time.

## 2024-05-01 NOTE — PROGRESS NOTES
[facility]   Progress Note and Procedure Note   NO Procedure    Patient Name: Yunior Clement  : 1955  MRN: [id number]    Past Medical History:   Diagnosis Date    Cerebral artery occlusion with cerebral infarction (HCC)     Hypertension      History reviewed. No pertinent surgical history.  Family History   Problem Relation Age of Onset    Heart Failure Mother      Social History     Tobacco Use    Smoking status: Never    Smokeless tobacco: Never   Substance Use Topics    Alcohol use: Yes     Comment: social    Drug use: Never     No Known Allergies  Current Outpatient Medications on File Prior to Encounter   Medication Sig Dispense Refill    potassium chloride (KLOR-CON) 20 MEQ packet Take 20 mEq by mouth daily      lisinopril-hydroCHLOROthiazide (PRINZIDE;ZESTORETIC) 20-25 MG per tablet Take 1 tablet by mouth daily      metoprolol tartrate (LOPRESSOR) 50 MG tablet Take 1 tablet by mouth 2 times daily      atorvastatin (LIPITOR) 40 MG tablet Take 1 tablet by mouth nightly       No current facility-administered medications on file prior to encounter.     No results found for: \"LABA1C\"      Vitals:    24 0900   BP: 134/72   Pulse: 67   Resp: 15   Temp: 98 °F (36.7 °C)      Wound 24 Toe (Comment  which one) Left;Plantar #1 (Active)   Wound Image   24 09   Wound Etiology Venous 24   Dressing Status Old drainage noted 24   Wound Cleansed Cleansed with saline 24   Dressing/Treatment Alginate with Ag;Gauze dressing/dressing sponge;ABD;Tape/Soft cloth adhesive tape;Roll gauze 24   Offloading for Diabetic Foot Ulcers Offloading ordered 24 09   Wound Length (cm) 1.9 cm 24   Wound Width (cm) 1.2 cm 24   Wound Depth (cm) 0.1 cm 24   Wound Surface Area (cm^2) 2.28 cm^2 24   Change in Wound Size % (l*w) 36.13 24   Wound Volume (cm^3) 0.228 cm^3 24   Wound

## 2024-05-01 NOTE — FLOWSHEET NOTE
05/01/24 0911   Anesthetic   Anesthetic 4% Lidocaine Liquid Topical   Left Leg Edema Point of Measurement   Leg circumference 32 cm   Ankle circumference 21 cm   LLE Neurovascular Assessment   Capillary Refill Greater than 3 seconds   Color Appropriate for Ethnicity   Temperature Warm   L Pedal Pulse +1   Wound 03/19/24 Toe (Comment  which one) Left;Plantar #1   Date First Assessed/Time First Assessed: 03/19/24 0910   Present on Original Admission: Yes  Wound Approximate Age at First Assessment (Weeks): (c)   Primary Wound Type: Other (comment)  Location: (c) Toe (Comment  which one)  Wound Location Orientati...   Wound Image    Dressing Status Old drainage noted   Wound Cleansed Cleansed with saline   Wound Length (cm) 1.9 cm   Wound Width (cm) 1.2 cm   Wound Depth (cm) 0.1 cm   Wound Surface Area (cm^2) 2.28 cm^2   Change in Wound Size % (l*w) 36.13   Wound Volume (cm^3) 0.228 cm^3   Wound Healing % 68   Wound Assessment Slough;Epithelialization   Drainage Amount Moderate (25-50%)   Drainage Description Serosanguinous   Odor Mild   Yenny-wound Assessment Maceration   Margins Epibole (rolled edges)     /72   Pulse 67   Temp 98 °F (36.7 °C) (Temporal)   Resp 15

## 2024-05-08 ENCOUNTER — HOSPITAL ENCOUNTER (OUTPATIENT)
Facility: HOSPITAL | Age: 69
Discharge: HOME | End: 2024-05-08
Payer: MEDICARE

## 2024-05-08 VITALS
SYSTOLIC BLOOD PRESSURE: 120 MMHG | RESPIRATION RATE: 18 BRPM | DIASTOLIC BLOOD PRESSURE: 59 MMHG | TEMPERATURE: 98.1 F | HEART RATE: 69 BPM

## 2024-05-08 DIAGNOSIS — L97.522 ULCER OF LEFT FOOT, WITH FAT LAYER EXPOSED (HCC): Primary | ICD-10-CM

## 2024-05-08 PROCEDURE — 11042 DBRDMT SUBQ TIS 1ST 20SQCM/<: CPT

## 2024-05-08 RX ORDER — TRIAMCINOLONE ACETONIDE 1 MG/G
OINTMENT TOPICAL ONCE
OUTPATIENT
Start: 2024-05-08 | End: 2024-05-08

## 2024-05-08 RX ORDER — BACITRACIN ZINC AND POLYMYXIN B SULFATE 500; 1000 [USP'U]/G; [USP'U]/G
OINTMENT TOPICAL ONCE
OUTPATIENT
Start: 2024-05-08 | End: 2024-05-08

## 2024-05-08 RX ORDER — CLOBETASOL PROPIONATE 0.5 MG/G
OINTMENT TOPICAL ONCE
OUTPATIENT
Start: 2024-05-08 | End: 2024-05-08

## 2024-05-08 RX ORDER — LIDOCAINE 50 MG/G
OINTMENT TOPICAL ONCE
OUTPATIENT
Start: 2024-05-08 | End: 2024-05-08

## 2024-05-08 RX ORDER — GINSENG 100 MG
CAPSULE ORAL ONCE
OUTPATIENT
Start: 2024-05-08 | End: 2024-05-08

## 2024-05-08 RX ORDER — LIDOCAINE 40 MG/G
CREAM TOPICAL ONCE
OUTPATIENT
Start: 2024-05-08 | End: 2024-05-08

## 2024-05-08 RX ORDER — SODIUM CHLOR/HYPOCHLOROUS ACID 0.033 %
SOLUTION, IRRIGATION IRRIGATION ONCE
OUTPATIENT
Start: 2024-05-08 | End: 2024-05-08

## 2024-05-08 RX ORDER — GENTAMICIN SULFATE 1 MG/G
OINTMENT TOPICAL ONCE
OUTPATIENT
Start: 2024-05-08 | End: 2024-05-08

## 2024-05-08 RX ORDER — LIDOCAINE HYDROCHLORIDE 20 MG/ML
JELLY TOPICAL ONCE
OUTPATIENT
Start: 2024-05-08 | End: 2024-05-08

## 2024-05-08 RX ORDER — IBUPROFEN 200 MG
TABLET ORAL ONCE
OUTPATIENT
Start: 2024-05-08 | End: 2024-05-08

## 2024-05-08 RX ORDER — BETAMETHASONE DIPROPIONATE 0.5 MG/G
CREAM TOPICAL ONCE
OUTPATIENT
Start: 2024-05-08 | End: 2024-05-08

## 2024-05-08 RX ORDER — LIDOCAINE HYDROCHLORIDE 40 MG/ML
SOLUTION TOPICAL ONCE
OUTPATIENT
Start: 2024-05-08 | End: 2024-05-08

## 2024-05-08 NOTE — FLOWSHEET NOTE
05/08/24 0905   Anesthetic   Anesthetic 4% Lidocaine Liquid Topical   Left Leg Edema Point of Measurement   Leg circumference 32 cm   Ankle circumference 22.8 cm   LLE Neurovascular Assessment   Capillary Refill Greater than 3 seconds   Temperature Warm   L Pedal Pulse +2   Wound 03/19/24 Toe (Comment  which one) Left;Plantar #1 4th   Date First Assessed/Time First Assessed: 03/19/24 0910   Present on Original Admission: Yes  Wound Approximate Age at First Assessment (Weeks): (c)   Primary Wound Type: Other (comment)  Location: (c) Toe (Comment  which one)  Wound Location Orientati...   Wound Image    Wound Length (cm) 2 cm   Wound Width (cm) 1.4 cm   Wound Depth (cm) 0.1 cm   Wound Surface Area (cm^2) 2.8 cm^2   Change in Wound Size % (l*w) 21.57   Wound Volume (cm^3) 0.28 cm^3   Wound Healing % 61   Wound Assessment Pink/red   Drainage Amount Moderate (25-50%)   Drainage Description Serosanguinous   Odor None   Yenny-wound Assessment Blanchable erythema;Maceration   Margins Flat/open edges     BP (!) 120/59   Pulse 69   Temp 98.1 °F (36.7 °C) (Temporal)   Resp 18

## 2024-05-08 NOTE — PROGRESS NOTES
[facility]   Progress Note and Procedure Note   NO Procedure    Patient Name: Yunior Clement  : 1955  MRN: [id number]    Past Medical History:   Diagnosis Date    Cerebral artery occlusion with cerebral infarction (HCC)     Hypertension      No past surgical history on file.  Family History   Problem Relation Age of Onset    Heart Failure Mother      Social History     Tobacco Use    Smoking status: Never    Smokeless tobacco: Never   Substance Use Topics    Alcohol use: Yes     Comment: social    Drug use: Never     No Known Allergies  Current Outpatient Medications on File Prior to Encounter   Medication Sig Dispense Refill    potassium chloride (KLOR-CON) 20 MEQ packet Take 20 mEq by mouth daily      lisinopril-hydroCHLOROthiazide (PRINZIDE;ZESTORETIC) 20-25 MG per tablet Take 1 tablet by mouth daily      metoprolol tartrate (LOPRESSOR) 50 MG tablet Take 1 tablet by mouth 2 times daily      atorvastatin (LIPITOR) 40 MG tablet Take 1 tablet by mouth nightly       No current facility-administered medications on file prior to encounter.     No results found for: \"LABA1C\"      Vitals:    24 0902   BP: (!) 120/59   Pulse: 69   Resp: 18   Temp: 98.1 °F (36.7 °C)      Wound 24 Toe (Comment  which one) Left;Plantar #1 4th (Active)   Wound Image   24   Wound Etiology Venous 24 09   Dressing Status Old drainage noted 24   Wound Cleansed Cleansed with saline 24   Dressing/Treatment ABD;Alginate with Ag;Gauze dressing/dressing sponge 24 0950   Offloading for Diabetic Foot Ulcers Offloading ordered 24 0950   Wound Length (cm) 2 cm 24   Wound Width (cm) 1.4 cm 24   Wound Depth (cm) 0.1 cm 24   Wound Surface Area (cm^2) 2.8 cm^2 24   Change in Wound Size % (l*w) 21.57 24   Wound Volume (cm^3) 0.28 cm^3 24   Wound Healing % 61 24   Post-Procedure Length (cm)

## 2024-05-08 NOTE — FLOWSHEET NOTE
05/08/24 0950   Wound 03/19/24 Toe (Comment  which one) Left;Plantar #1 4th   Date First Assessed/Time First Assessed: 03/19/24 0910   Present on Original Admission: Yes  Wound Approximate Age at First Assessment (Weeks): (c)   Primary Wound Type: Other (comment)  Location: (c) Toe (Comment  which one)  Wound Location Orientati...   Dressing/Treatment ABD;Alginate with Ag;Gauze dressing/dressing sponge  (timolol drops)   Offloading for Diabetic Foot Ulcers Offloading ordered     Discharge Condition: Stable    Pain: 0    Ambulatory Status:Wheelchair    Discharge Destination: Home    Transportation:Car    Accompanied by: family    Discharge instructions reviewed with family and copy or written instructions have been provided. All questions/concerns have been addressed at this time.

## 2024-05-08 NOTE — PATIENT INSTRUCTIONS
Discharge Instructions for  [facility]  [address] [place] [address]  Telephone: [phone]   FAX [phone]    NAME:  Yunior Clement  YOB: 1955  ACCOUNT NUMBER :  [id number]  DATE:  5/8/2024     : Steve BERKOWITZ     HOME HEALTH: [facility]    Referral: The physician has recommended vascular studies with Dr. Nicolas John's office:  Studies to be completed: Ankle Brachial Index Bilateral lower legs and Venous Reflux bilateral lower legs     Dr. John's office should reach out to patient within 24 to 48 hours with information on appointment time and date  If a call has not been made within 24 to 48 hours please contact Dr. John's office at [phone] (Fax:[phone])  Be prepared for possible wound dressing removal; if compression wrap is in place please make a nurse visit appointment with the wound care center [phone] or coordinate with home health agency to re-apply compression wrap after vascular visit.  Nicolas John  Physician  Provider Summary  Title Resident? Provider type   MD No Physician   Primary Contact Information  Phone Fax E-mail Address   [phone] Not available [address] [id number]       Specialties      General Surgery, Vascular Surgery               Wound Cleansing:   Do not scrub or use excessive force.  Cleanse wound prior to applying a clean dressing with:      [x] Cleanse wound with: BABY SHAMPOO LATHER  LEAVE on 1-2 MINUTES ON WOUND THEN RINSE WITH WATER OR SALINE    [] May Shower at Discharge     [] Shower on days of dressing change, remove dressing first    [] Keep Wound Dry in Shower (may purchase a cast cover if needed)     Topical Treatments:  Do not apply lotions, creams, or ointments to wound bed unless directed.     [x] Apply moisturizing lotion A&D ointment  to skin surrounding the wound prior to dressing change.  [] Apply antifungal ointment to skin surrounding the

## 2024-05-22 ENCOUNTER — HOSPITAL ENCOUNTER (OUTPATIENT)
Facility: HOSPITAL | Age: 69
Discharge: HOME | End: 2024-05-22
Payer: MEDICARE

## 2024-05-22 VITALS
RESPIRATION RATE: 18 BRPM | HEART RATE: 69 BPM | DIASTOLIC BLOOD PRESSURE: 74 MMHG | TEMPERATURE: 97.9 F | SYSTOLIC BLOOD PRESSURE: 149 MMHG

## 2024-05-22 DIAGNOSIS — L97.522 ULCER OF LEFT FOOT, WITH FAT LAYER EXPOSED (HCC): Primary | ICD-10-CM

## 2024-05-22 LAB
ERYTHROCYTE [DISTWIDTH] IN BLOOD BY AUTOMATED COUNT: 13.2 % (ref 11.5–14.5)
EST. AVERAGE GLUCOSE BLD GHB EST-MCNC: 105 MG/DL
HBA1C MFR BLD: 5.3 % (ref 4–5.6)
HCT VFR BLD AUTO: 39.6 % (ref 36.6–50.3)
HGB BLD-MCNC: 13.6 G/DL (ref 12.1–17)
MCH RBC QN AUTO: 28.5 PG (ref 26–34)
MCHC RBC AUTO-ENTMCNC: 34.3 G/DL (ref 30–36.5)
MCV RBC AUTO: 83 FL (ref 80–99)
NRBC # BLD: 0 K/UL (ref 0–0.01)
NRBC BLD-RTO: 0 PER 100 WBC
PLATELET # BLD AUTO: 106 K/UL (ref 150–400)
RBC # BLD AUTO: 4.77 M/UL (ref 4.1–5.7)
WBC # BLD AUTO: 4.6 K/UL (ref 4.1–11.1)

## 2024-05-22 PROCEDURE — 87077 CULTURE AEROBIC IDENTIFY: CPT

## 2024-05-22 PROCEDURE — 87147 CULTURE TYPE IMMUNOLOGIC: CPT

## 2024-05-22 PROCEDURE — 36415 COLL VENOUS BLD VENIPUNCTURE: CPT

## 2024-05-22 PROCEDURE — 87070 CULTURE OTHR SPECIMN AEROBIC: CPT

## 2024-05-22 PROCEDURE — 87205 SMEAR GRAM STAIN: CPT

## 2024-05-22 PROCEDURE — 83036 HEMOGLOBIN GLYCOSYLATED A1C: CPT

## 2024-05-22 PROCEDURE — 85027 COMPLETE CBC AUTOMATED: CPT

## 2024-05-22 PROCEDURE — 80053 COMPREHEN METABOLIC PANEL: CPT

## 2024-05-22 PROCEDURE — 11042 DBRDMT SUBQ TIS 1ST 20SQCM/<: CPT

## 2024-05-22 PROCEDURE — 87186 SC STD MICRODIL/AGAR DIL: CPT

## 2024-05-22 RX ORDER — LIDOCAINE HYDROCHLORIDE 20 MG/ML
JELLY TOPICAL ONCE
OUTPATIENT
Start: 2024-05-22 | End: 2024-05-22

## 2024-05-22 RX ORDER — BACITRACIN ZINC AND POLYMYXIN B SULFATE 500; 1000 [USP'U]/G; [USP'U]/G
OINTMENT TOPICAL ONCE
OUTPATIENT
Start: 2024-05-22 | End: 2024-05-22

## 2024-05-22 RX ORDER — LIDOCAINE 50 MG/G
OINTMENT TOPICAL ONCE
OUTPATIENT
Start: 2024-05-22 | End: 2024-05-22

## 2024-05-22 RX ORDER — TRIAMCINOLONE ACETONIDE 1 MG/G
OINTMENT TOPICAL ONCE
OUTPATIENT
Start: 2024-05-22 | End: 2024-05-22

## 2024-05-22 RX ORDER — CLOBETASOL PROPIONATE 0.5 MG/G
OINTMENT TOPICAL ONCE
OUTPATIENT
Start: 2024-05-22 | End: 2024-05-22

## 2024-05-22 RX ORDER — LIDOCAINE HYDROCHLORIDE 40 MG/ML
SOLUTION TOPICAL ONCE
OUTPATIENT
Start: 2024-05-22 | End: 2024-05-22

## 2024-05-22 RX ORDER — LIDOCAINE 40 MG/G
CREAM TOPICAL ONCE
OUTPATIENT
Start: 2024-05-22 | End: 2024-05-22

## 2024-05-22 RX ORDER — BETAMETHASONE DIPROPIONATE 0.5 MG/G
CREAM TOPICAL ONCE
OUTPATIENT
Start: 2024-05-22 | End: 2024-05-22

## 2024-05-22 RX ORDER — GINSENG 100 MG
CAPSULE ORAL ONCE
OUTPATIENT
Start: 2024-05-22 | End: 2024-05-22

## 2024-05-22 RX ORDER — SODIUM CHLOR/HYPOCHLOROUS ACID 0.033 %
SOLUTION, IRRIGATION IRRIGATION ONCE
OUTPATIENT
Start: 2024-05-22 | End: 2024-05-22

## 2024-05-22 RX ORDER — IBUPROFEN 200 MG
TABLET ORAL ONCE
OUTPATIENT
Start: 2024-05-22 | End: 2024-05-22

## 2024-05-22 RX ORDER — GENTAMICIN SULFATE 1 MG/G
OINTMENT TOPICAL ONCE
OUTPATIENT
Start: 2024-05-22 | End: 2024-05-22

## 2024-05-22 NOTE — PATIENT INSTRUCTIONS
: [x] walking  [] in bed [] Sitting      Dietary:  [x] Increase Protein: examples ( Meat, cheese, eggs, greek yogurt, premier protein drink, fish, nuts )   [] Kamlesh  [] Protien drink supplement   [] Recommended Supplements :                      Return Appointment:    [x] Return Appointment: With Dr. Curtis Andersen  in 2 Week(s)    [] Nurse visit scheduled in  day(s) or  week(s)     _____________________________ 5/22/2024 Dr. Curtis Andersen

## 2024-05-22 NOTE — FLOWSHEET NOTE
05/22/24 0909   Anesthetic   Anesthetic 4% Lidocaine Liquid Topical   Left Leg Edema Point of Measurement   Leg circumference 32 cm   Ankle circumference 22.2 cm   LLE Neurovascular Assessment   Capillary Refill Less than/Equal to 3 seconds   Temperature Cool   L Pedal Pulse +1   Wound 03/19/24 Toe (Comment  which one) Left;Plantar #1 4th   Date First Assessed/Time First Assessed: 03/19/24 0910   Present on Original Admission: Yes  Wound Approximate Age at First Assessment (Weeks): (c)   Primary Wound Type: Other (comment)  Location: (c) Toe (Comment  which one)  Wound Location Orientati...   Wound Image    Wound Length (cm) 1.5 cm   Wound Width (cm) 0.8 cm   Wound Depth (cm) 0.1 cm   Wound Surface Area (cm^2) 1.2 cm^2   Change in Wound Size % (l*w) 66.39   Wound Volume (cm^3) 0.12 cm^3   Wound Healing % 83   Wound Assessment [place]/red;Slough   Drainage Amount Moderate (25-50%)   Drainage Description Serosanguinous   Odor None   Yenny-wound Assessment Blanchable erythema   Margins Flat/open edges     BP (!) 149/74   Pulse 69   Temp 97.9 °F (36.6 °C) (Temporal)   Resp 18

## 2024-05-22 NOTE — FLOWSHEET NOTE
05/22/24 0945   Left Leg Edema Point of Measurement   Compression Therapy Tubular elastic support bandage   Wound 03/19/24 Toe (Comment  which one) Left;Plantar #1 4th   Date First Assessed/Time First Assessed: 03/19/24 0910   Present on Original Admission: Yes  Wound Approximate Age at First Assessment (Weeks): (c)   Primary Wound Type: Other (comment)  Location: (c) Toe (Comment  which one)  Wound Location Orientati...   Dressing Status New dressing applied   Dressing/Treatment Alginate with Ag;Gauze dressing/dressing sponge;Tape/Soft cloth adhesive tape;Roll gauze;Moisturizing cream;Other (comment)  (timolol drops)   Offloading for Diabetic Foot Ulcers Offloading ordered     Discharge Condition: Stable    Pain: 0    Ambulatory Status:Wheelchair    Discharge Destination: Home    Transportation:Car    Accompanied by: Self and Family    Discharge instructions reviewed with Self and Family and copy or written instructions have been provided. All questions/concerns have been addressed at this time.

## 2024-05-23 LAB
ALBUMIN SERPL-MCNC: 3.4 G/DL (ref 3.5–5)
ALBUMIN/GLOB SERPL: 1 (ref 1.1–2.2)
ALP SERPL-CCNC: 121 U/L (ref 45–117)
ALT SERPL-CCNC: 32 U/L (ref 12–78)
ANION GAP SERPL CALC-SCNC: 2 MMOL/L (ref 5–15)
AST SERPL-CCNC: 15 U/L (ref 15–37)
BILIRUB SERPL-MCNC: 0.4 MG/DL (ref 0.2–1)
BUN SERPL-MCNC: 17 MG/DL (ref 6–20)
BUN/CREAT SERPL: 12 (ref 12–20)
CALCIUM SERPL-MCNC: 9.9 MG/DL (ref 8.5–10.1)
CHLORIDE SERPL-SCNC: 106 MMOL/L (ref 97–108)
CO2 SERPL-SCNC: 30 MMOL/L (ref 21–32)
CREAT SERPL-MCNC: 1.4 MG/DL (ref 0.7–1.3)
GLOBULIN SER CALC-MCNC: 3.4 G/DL (ref 2–4)
GLUCOSE SERPL-MCNC: 85 MG/DL (ref 65–100)
POTASSIUM SERPL-SCNC: 4.2 MMOL/L (ref 3.5–5.1)
PROT SERPL-MCNC: 6.8 G/DL (ref 6.4–8.2)
SODIUM SERPL-SCNC: 138 MMOL/L (ref 136–145)

## 2024-05-25 LAB
BACTERIA SPEC CULT: ABNORMAL
GRAM STN SPEC: ABNORMAL
SERVICE CMNT-IMP: ABNORMAL

## 2024-06-05 ENCOUNTER — HOSPITAL ENCOUNTER (OUTPATIENT)
Facility: HOSPITAL | Age: 69
Discharge: HOME | End: 2024-06-05
Payer: MEDICARE

## 2024-06-05 VITALS
RESPIRATION RATE: 18 BRPM | DIASTOLIC BLOOD PRESSURE: 85 MMHG | TEMPERATURE: 97.5 F | SYSTOLIC BLOOD PRESSURE: 152 MMHG | HEART RATE: 71 BPM

## 2024-06-05 DIAGNOSIS — L97.522 ULCER OF LEFT FOOT, WITH FAT LAYER EXPOSED (HCC): Primary | ICD-10-CM

## 2024-06-05 PROBLEM — A49.02 MRSA (METHICILLIN RESISTANT STAPHYLOCOCCUS AUREUS): Status: ACTIVE | Noted: 2024-06-05

## 2024-06-05 PROCEDURE — 11042 DBRDMT SUBQ TIS 1ST 20SQCM/<: CPT

## 2024-06-05 RX ORDER — CLOBETASOL PROPIONATE 0.5 MG/G
OINTMENT TOPICAL ONCE
OUTPATIENT
Start: 2024-06-05 | End: 2024-06-05

## 2024-06-05 RX ORDER — SODIUM CHLOR/HYPOCHLOROUS ACID 0.033 %
SOLUTION, IRRIGATION IRRIGATION ONCE
OUTPATIENT
Start: 2024-06-05 | End: 2024-06-05

## 2024-06-05 RX ORDER — GINSENG 100 MG
CAPSULE ORAL ONCE
OUTPATIENT
Start: 2024-06-05 | End: 2024-06-05

## 2024-06-05 RX ORDER — BETAMETHASONE DIPROPIONATE 0.5 MG/G
CREAM TOPICAL ONCE
OUTPATIENT
Start: 2024-06-05 | End: 2024-06-05

## 2024-06-05 RX ORDER — LIDOCAINE 50 MG/G
OINTMENT TOPICAL ONCE
OUTPATIENT
Start: 2024-06-05 | End: 2024-06-05

## 2024-06-05 RX ORDER — TRIAMCINOLONE ACETONIDE 1 MG/G
OINTMENT TOPICAL ONCE
OUTPATIENT
Start: 2024-06-05 | End: 2024-06-05

## 2024-06-05 RX ORDER — LIDOCAINE HYDROCHLORIDE 40 MG/ML
SOLUTION TOPICAL ONCE
OUTPATIENT
Start: 2024-06-05 | End: 2024-06-05

## 2024-06-05 RX ORDER — LIDOCAINE HYDROCHLORIDE 20 MG/ML
JELLY TOPICAL ONCE
OUTPATIENT
Start: 2024-06-05 | End: 2024-06-05

## 2024-06-05 RX ORDER — GENTAMICIN SULFATE 1 MG/G
OINTMENT TOPICAL ONCE
OUTPATIENT
Start: 2024-06-05 | End: 2024-06-05

## 2024-06-05 RX ORDER — LIDOCAINE 40 MG/G
CREAM TOPICAL ONCE
OUTPATIENT
Start: 2024-06-05 | End: 2024-06-05

## 2024-06-05 RX ORDER — IBUPROFEN 200 MG
TABLET ORAL ONCE
OUTPATIENT
Start: 2024-06-05 | End: 2024-06-05

## 2024-06-05 RX ORDER — BACITRACIN ZINC AND POLYMYXIN B SULFATE 500; 1000 [USP'U]/G; [USP'U]/G
OINTMENT TOPICAL ONCE
OUTPATIENT
Start: 2024-06-05 | End: 2024-06-05

## 2024-06-05 NOTE — PATIENT INSTRUCTIONS
clinic to add felt to surgical shoe for offloading)   [] Podus Boot(s)   [] Foam Boot(s)  [] Knee scooter [] Cast Boot [] CROW Boot  [] Other:  [] Total non-weight bearing : [] Right Leg     [] Left Leg  [x] Off-loading when : [x] walking  [] in bed [] Sitting      Dietary:  [x] Increase Protein: examples ( Meat, cheese, eggs, greek yogurt, premier protein drink, fish, nuts )   [] Kamlesh  [] Protien drink supplement   [] Recommended Supplements :                      Return Appointment:    [x] Return Appointment: With Dr. Curtis Andersen  in 1 Week(s)    [] Nurse visit scheduled in  day(s) or  week(s)     _____________________________ 6/5/2024 Dr. Curtis Andersen

## 2024-06-05 NOTE — FLOWSHEET NOTE
06/05/24 0856   Anesthetic   Anesthetic 4% Lidocaine Liquid Topical   Left Leg Edema Point of Measurement   Leg circumference 31 cm   Ankle circumference 22.5 cm   LLE Neurovascular Assessment   Capillary Refill Less than/Equal to 3 seconds   Color Appropriate for Ethnicity   Temperature Warm   L Pedal Pulse +1   Wound 03/19/24 Toe (Comment  which one) Left;Plantar #1 4th   Date First Assessed/Time First Assessed: 03/19/24 0910   Present on Original Admission: Yes  Wound Approximate Age at First Assessment (Weeks): (c)   Primary Wound Type: Other (comment)  Location: (c) Toe (Comment  which one)  Wound Location Orientati...   Wound Image    Wound Cleansed Cleansed with saline   Wound Length (cm) 1.7 cm   Wound Width (cm) 1 cm   Wound Depth (cm) 0.1 cm   Wound Surface Area (cm^2) 1.7 cm^2   Change in Wound Size % (l*w) 52.38   Wound Volume (cm^3) 0.17 cm^3   Wound Healing % 76   Wound Assessment Other (Comment);Pink/red;Slough  (dead skin from blister)   Drainage Amount Moderate (25-50%)   Drainage Description Serosanguinous   Odor None   Yenny-wound Assessment Fragile   Margins Flat/open edges   Pain Assessment   Pain Assessment None - Denies Pain     BP (!) 152/85   Pulse 71   Temp 97.5 °F (36.4 °C) (Temporal)   Resp 18

## 2024-06-05 NOTE — FLOWSHEET NOTE
06/05/24 0955   Left Leg Edema Point of Measurement   Compression Therapy Tubular elastic support bandage   Wound 03/19/24 Toe (Comment  which one) Left;Plantar #1 4th   Date First Assessed/Time First Assessed: 03/19/24 0910   Present on Original Admission: Yes  Wound Approximate Age at First Assessment (Weeks): (c)   Primary Wound Type: Other (comment)  Location: (c) Toe (Comment  which one)  Wound Location Orientati...   Wound Cleansed Vashe   Dressing/Treatment Gauze dressing/dressing sponge;Roll gauze;Tape/Soft cloth adhesive tape  (mupirocin ointment)   Offloading for Diabetic Foot Ulcers Offloading ordered     Discharge Condition: Stable    Pain: 0 - numbness present    Ambulatory Status:Wheelchair    Discharge Destination: Home    Transportation:Car    Accompanied by: Self and Family    Discharge instructions reviewed with Self and Family and copy or written instructions have been provided. All questions/concerns have been addressed at this time.

## 2024-06-05 NOTE — PROGRESS NOTES
[facility]   Progress Note and Procedure Note   NO Procedure    Patient Name: Yunior Clement  : 1955  MRN: [id number]    Past Medical History:   Diagnosis Date    Cerebral artery occlusion with cerebral infarction (HCC)     Hypertension      History reviewed. No pertinent surgical history.  Family History   Problem Relation Age of Onset    Heart Failure Mother      Social History     Tobacco Use    Smoking status: Never    Smokeless tobacco: Never   Substance Use Topics    Alcohol use: Yes     Comment: social    Drug use: Never     No Known Allergies  Current Outpatient Medications on File Prior to Encounter   Medication Sig Dispense Refill    potassium chloride (KLOR-CON) 20 MEQ packet Take 20 mEq by mouth daily      lisinopril-hydroCHLOROthiazide (PRINZIDE;ZESTORETIC) 20-25 MG per tablet Take 1 tablet by mouth daily      metoprolol tartrate (LOPRESSOR) 50 MG tablet Take 1 tablet by mouth 2 times daily      atorvastatin (LIPITOR) 40 MG tablet Take 1 tablet by mouth nightly       No current facility-administered medications on file prior to encounter.     Lab Results   Component Value Date/Time    LABA1C 5.3 2024 09:59 AM         Vitals:    24 0856   BP: (!) 152/85   Pulse: 71   Resp: 18   Temp: 97.5 °F (36.4 °C)      Wound 24 Toe (Comment  which one) Left;Plantar #1 4th (Active)   Wound Image   24 0856   Wound Etiology Venous 24 0926   Dressing Status New dressing applied 24 0945   Wound Cleansed Vashe 24 0955   Dressing/Treatment Gauze dressing/dressing sponge;Roll gauze;Tape/Soft cloth adhesive tape 24 0955   Offloading for Diabetic Foot Ulcers Offloading ordered 24 0955   Wound Length (cm) 1.7 cm 24 0856   Wound Width (cm) 1 cm 24 0856   Wound Depth (cm) 0.1 cm 24 0856   Wound Surface Area (cm^2) 1.7 cm^2 24 0856   Change in Wound Size % (l*w) 52.38 24 0856   Wound Volume (cm^3) 0.17 cm^3 24

## 2024-06-07 ENCOUNTER — HOSPITAL ENCOUNTER (OUTPATIENT)
Facility: HOSPITAL | Age: 69
Setting detail: INFUSION SERIES
Discharge: HOME | End: 2024-06-07
Payer: MEDICARE

## 2024-06-07 VITALS
TEMPERATURE: 98.4 F | RESPIRATION RATE: 18 BRPM | HEART RATE: 73 BPM | BODY MASS INDEX: 27.44 KG/M2 | WEIGHT: 180.5 LBS | DIASTOLIC BLOOD PRESSURE: 75 MMHG | SYSTOLIC BLOOD PRESSURE: 136 MMHG

## 2024-06-07 DIAGNOSIS — A49.02 MRSA (METHICILLIN RESISTANT STAPHYLOCOCCUS AUREUS): Primary | ICD-10-CM

## 2024-06-07 PROCEDURE — 6360000002 HC RX W HCPCS: Performed by: EMERGENCY MEDICINE

## 2024-06-07 PROCEDURE — 96365 THER/PROPH/DIAG IV INF INIT: CPT

## 2024-06-07 PROCEDURE — 2580000003 HC RX 258: Performed by: EMERGENCY MEDICINE

## 2024-06-07 RX ORDER — SODIUM CHLORIDE 9 MG/ML
INJECTION, SOLUTION INTRAVENOUS CONTINUOUS
Status: DISCONTINUED | OUTPATIENT
Start: 2024-06-07 | End: 2024-06-07

## 2024-06-07 RX ORDER — DEXTROSE MONOHYDRATE 50 MG/ML
INJECTION, SOLUTION INTRAVENOUS CONTINUOUS
Status: DISCONTINUED | OUTPATIENT
Start: 2024-06-07 | End: 2024-06-08 | Stop reason: HOSPADM

## 2024-06-07 RX ADMIN — DEXTROSE MONOHYDRATE: 50 INJECTION, SOLUTION INTRAVENOUS at 15:59

## 2024-06-07 RX ADMIN — DALBAVANCIN 1125 MG: 500 INJECTION, POWDER, FOR SOLUTION INTRAVENOUS at 16:01

## 2024-06-07 ASSESSMENT — PAIN SCALES - GENERAL: PAINLEVEL_OUTOF10: 0

## 2024-06-07 NOTE — PROGRESS NOTES
Outpatient Infusion Center Short Visit Progress Note    Mr. Clement admitted to \[facility]\"" for Dalvance ambulatory in stable condition. Video  utilized for assessment. Opportunity for questions provided.    Vital Signs:  /75   Pulse 73   Temp 98.4 °F (36.9 °C) (Temporal)   Resp 18   Wt 81.9 kg (180 lb 8 oz)   BMI 27.44 kg/m²       24G PIV placed with positive blood return.           Medications:  Medications Administered         dalbavancin (DALVANCE) 1,125 mg in dextrose 5 % 250 mL IVPB Admin Date  06/07/2024 Action  New Bag Dose  1,125 mg Rate  662.5 mL/hr Route  IntraVENous Administered By  Feliz Joshua RN        dextrose 5 % solution Admin Date  06/07/2024 Action  New Bag Dose   Rate  25 mL/hr Route  IntraVENous Administered By  Feliz Joshua, WOO                  Patient tolerated treatment well. PIV removed. Patient discharged from Outpatient Infusion Center ambulatory in no distress. Patient aware of next appointment.    Feliz Joshua RN  June 7, 2024    Future Appointments   Date Time Provider Department Center   6/12/2024  9:00 AM Curtis Andersen Jr., MD [facility]

## 2024-06-12 ENCOUNTER — HOSPITAL ENCOUNTER (OUTPATIENT)
Facility: HOSPITAL | Age: 69
Discharge: HOME | End: 2024-06-12
Payer: MEDICARE

## 2024-06-12 VITALS
HEART RATE: 70 BPM | TEMPERATURE: 98.4 F | RESPIRATION RATE: 18 BRPM | SYSTOLIC BLOOD PRESSURE: 133 MMHG | DIASTOLIC BLOOD PRESSURE: 66 MMHG

## 2024-06-12 DIAGNOSIS — L97.522 ULCER OF LEFT FOOT, WITH FAT LAYER EXPOSED (HCC): Primary | ICD-10-CM

## 2024-06-12 PROCEDURE — 11042 DBRDMT SUBQ TIS 1ST 20SQCM/<: CPT

## 2024-06-12 RX ORDER — TRIAMCINOLONE ACETONIDE 1 MG/G
OINTMENT TOPICAL ONCE
OUTPATIENT
Start: 2024-06-12 | End: 2024-06-12

## 2024-06-12 RX ORDER — GINSENG 100 MG
CAPSULE ORAL ONCE
OUTPATIENT
Start: 2024-06-12 | End: 2024-06-12

## 2024-06-12 RX ORDER — BETAMETHASONE DIPROPIONATE 0.5 MG/G
CREAM TOPICAL ONCE
OUTPATIENT
Start: 2024-06-12 | End: 2024-06-12

## 2024-06-12 RX ORDER — CLOBETASOL PROPIONATE 0.5 MG/G
OINTMENT TOPICAL ONCE
OUTPATIENT
Start: 2024-06-12 | End: 2024-06-12

## 2024-06-12 RX ORDER — LIDOCAINE 40 MG/G
CREAM TOPICAL ONCE
OUTPATIENT
Start: 2024-06-12 | End: 2024-06-12

## 2024-06-12 RX ORDER — LIDOCAINE HYDROCHLORIDE 20 MG/ML
JELLY TOPICAL ONCE
OUTPATIENT
Start: 2024-06-12 | End: 2024-06-12

## 2024-06-12 RX ORDER — IBUPROFEN 200 MG
TABLET ORAL ONCE
OUTPATIENT
Start: 2024-06-12 | End: 2024-06-12

## 2024-06-12 RX ORDER — LIDOCAINE HYDROCHLORIDE 40 MG/ML
SOLUTION TOPICAL ONCE
OUTPATIENT
Start: 2024-06-12 | End: 2024-06-12

## 2024-06-12 RX ORDER — GENTAMICIN SULFATE 1 MG/G
OINTMENT TOPICAL ONCE
OUTPATIENT
Start: 2024-06-12 | End: 2024-06-12

## 2024-06-12 RX ORDER — LIDOCAINE 50 MG/G
OINTMENT TOPICAL ONCE
OUTPATIENT
Start: 2024-06-12 | End: 2024-06-12

## 2024-06-12 RX ORDER — FUROSEMIDE 20 MG/1
20 TABLET ORAL
COMMUNITY
Start: 2024-06-06 | End: 2024-09-04

## 2024-06-12 RX ORDER — SODIUM CHLOR/HYPOCHLOROUS ACID 0.033 %
SOLUTION, IRRIGATION IRRIGATION ONCE
OUTPATIENT
Start: 2024-06-12 | End: 2024-06-12

## 2024-06-12 RX ORDER — BACITRACIN ZINC AND POLYMYXIN B SULFATE 500; 1000 [USP'U]/G; [USP'U]/G
OINTMENT TOPICAL ONCE
OUTPATIENT
Start: 2024-06-12 | End: 2024-06-12

## 2024-06-12 NOTE — PATIENT INSTRUCTIONS
Discharge Instructions for  [facility]  [address] [place] [address]  Telephone: [phone]   FAX [phone]    NAME:  Yunior Clement  YOB: 1955  ACCOUNT NUMBER :  [id number]  DATE:  6/12/2024     : Steve BERKOWITZ     HOME HEALTH: [facility]    Will send out insurance authorization for skin sub called [employer] to apply to wound   Will send out insurance authorization for [employer] rich protein application     Wound Cleansing:   Do not scrub or use excessive force.  Cleanse wound prior to applying a clean dressing with:      [x] Cleanse wound with: VASHE     [] May Shower at Discharge     [] Shower on days of dressing change, remove dressing first    [] Keep Wound Dry in Shower (may purchase a cast cover if needed)     Topical Treatments:  Do not apply lotions, creams, or ointments to wound bed unless directed.     [] Apply moisturizing lotion A&D ointment  to skin surrounding the wound prior to dressing change.  [] Apply antifungal ointment to skin surrounding the wound prior to dressing change.  [] Apply thin film of Zinc barrier ointment to skin immediately around wound.  [] Other:      Dressings:           Wound Location Left plantar foot   Apply Primary Dressing:      [x] MUPIROCIN OINTMENT then cutimed sorbact (green mesh) over top         Cover and Secure with:    [x] Gauze single piece between toe, gauze, roll gauze, single tubi.   [x] Medipore tape     Avoid contact of tape with skin.    Change dressing:   [] Continue Daily   [x] Every Other Day   [] Three times per week  [] Once a week   [] Do Not Change Dressing     [] Other:    Off-Loading: [x] Surgical shoe (wound clinic to add felt to surgical shoe for offloading)   [] Podus Boot(s)   [] Foam Boot(s)  [] Knee scooter [] Cast Boot [] CROW Boot  [] Other:  [] Total non-weight bearing : [] Right Leg     [] Left Leg  [x] Off-loading when : [x] walking  [] in bed [] Sitting      Dietary:  [x]

## 2024-06-12 NOTE — FLOWSHEET NOTE
06/12/24 0905   Anesthetic   Anesthetic 4% Lidocaine Liquid Topical   Left Leg Edema Point of Measurement   Leg circumference 33.3 cm   Ankle circumference 21.6 cm   LLE Neurovascular Assessment   Capillary Refill Less than/Equal to 3 seconds   Temperature Cool   L Pedal Pulse +1   Wound 03/19/24 Toe (Comment  which one) Left;Plantar #1 4th   Date First Assessed/Time First Assessed: 03/19/24 0910   Present on Original Admission: Yes  Wound Approximate Age at First Assessment (Weeks): (c)   Primary Wound Type: Other (comment)  Location: (c) Toe (Comment  which one)  Wound Location Orientati...   Wound Image    Dressing Status Old drainage noted   Wound Cleansed Cleansed with saline   Wound Length (cm) 1.5 cm   Wound Width (cm) 1 cm   Wound Depth (cm) 0.1 cm   Wound Surface Area (cm^2) 1.5 cm^2   Change in Wound Size % (l*w) 57.98   Wound Volume (cm^3) 0.15 cm^3   Wound Healing % 79   Wound Assessment [place]/red;Slough   Drainage Amount Moderate (25-50%)   Drainage Description Serosanguinous   Odor None   Yenny-wound Assessment Maceration   Margins Epibole (rolled edges)     /66   Pulse 70   Temp 98.4 °F (36.9 °C) (Temporal)   Resp 18

## 2024-06-12 NOTE — PROGRESS NOTES
Surface Area (cm^2) 1.5 cm^2 06/12/24 0905   Change in Wound Size % (l*w) 57.98 06/12/24 0905   Wound Volume (cm^3) 0.15 cm^3 06/12/24 0905   Wound Healing % 79 06/12/24 0905   Post-Procedure Length (cm) 1.5 cm 06/12/24 0923   Post-Procedure Width (cm) 1 cm 06/12/24 0923   Post-Procedure Depth (cm) 0.2 cm 06/12/24 0923   Post-Procedure Surface Area (cm^2) 1.5 cm^2 06/12/24 0923   Post-Procedure Volume (cm^3) 0.3 cm^3 06/12/24 0923   Wound Assessment Pink/red;Slough 06/12/24 0905   Drainage Amount Moderate (25-50%) 06/12/24 0905   Drainage Description Serosanguinous 06/12/24 0905   Odor None 06/12/24 0905   Yenny-wound Assessment Maceration 06/12/24 0905   Margins Epibole (rolled edges) 06/12/24 0905   Wound Thickness Description not for Pressure Injury Full thickness 06/12/24 0923   Number of days: 85               I have noted, and reviewed today's data for this patient in [facility] and concur with same. The focused physical exam, other physical findings, Medical history, Review of Symptoms and Medications today remains unchanged except as noted below.     Patient notes today: no pain, did have infusion of Dalvance, and blood vessel tests.  Lesion/Wound, focused exam on Presentation today: vascular  shoes RITESH>1.2 R/L  L plantar 4th toe MP area moist surface denuded skin.    Procedure:   Ulcer Type: traumatic  Consent obtained: Yes  Time out taken: Yes    Wound,(s)# L plantar.  Procedure name: sharp excisional debridement.  Anaesthesia: Lidocaine; topical    Description: using a sharp curette I excised all non viable tissue to effect a clean bleeding base.  Tissue Level/depth of debridement: subq.  Post debridement dimensions changed as noted:    Depth, add 1.0 mm;    Width, add 0.0 mm   Length, add 0.0 mm.   Blood Loss: 1 CCs. Bleeding abated post treatment .   Post Procedure Condition/ Diagnosis: stalled effect.  Follow up and Future plans today: RTC 2 weeks apply for skin sub.    Specimens: 0.  Patient

## 2024-06-12 NOTE — FLOWSHEET NOTE
06/12/24 0943   Left Leg Edema Point of Measurement   Compression Therapy Tubular elastic support bandage   Wound 03/19/24 Toe (Comment  which one) Left;Plantar #1 4th   Date First Assessed/Time First Assessed: 03/19/24 0910   Present on Original Admission: Yes  Wound Approximate Age at First Assessment (Weeks): (c)   Primary Wound Type: Other (comment)  Location: (c) Toe (Comment  which one)  Wound Location Orientati...   Wound Cleansed Vashe   Dressing/Treatment Other (comment);Cutimed/Sorbact;Gauze dressing/dressing sponge;Roll gauze;Tape change  (mupriocin ointment)   Offloading for Diabetic Foot Ulcers Offloading ordered     Discharge Condition: Stable    Pain: 0    Ambulatory Status:Wheelchair    Discharge Destination: Home    Transportation:Car    Accompanied by: Self and Family    Discharge instructions reviewed with Self and Family and copy or written instructions have been provided. All questions/concerns have been addressed at this time.      show

## 2024-06-13 NOTE — PLAN OF CARE
Insurance Verification Request      Ordering Center:     [facility]  [address] [address] [phone]  Wound Center Phone Number [phone]  Fax# [phone]  GIUSEPPE HARVEY RN  Facility NPI: [id number]  Facility Tax ID [id number]    Provider Information:     Provider's Name : Dr. Libby Andersen   Provider's NPI: [id number]  Provider's Address   [facility]  [address] [address]    Patient Information:     Yunior Urbano  [address]   [facility] [address]   [phone]   : 1955  AGE: 69 y.o.     GENDER: male   TODAYS DATE:  2024    Insurance:     PRIMARY INSURANCE:  Plan: [employer]-PPO MEDICARE  Coverage: [employer] MEDICARE  Effective Date: 2023  Group Number: [unfilled]  Subscriber Number: [id number] - (Medicare Managed)    Payer/Plan Subscr  Sex Relation Sub. Ins. ID Effective Group Num   1. [employer] MEDICA* YUNIOR REMY 1955 Male Self [id number]                                   [address] [address]       Application/product Information:     Benefit Verification Request:    Reason for Request: New Wound    [employer] FAX# [phone]    Face/Hands/Feet 27351 (1st 25 sq cm)    [employer]    Has patient been treated with any other Skin Substitute on this Wound:     No    If yes, How many previous applications:  0    WOUND #: 1    Total Square CM: 1.5    Procedure setting: Hospital Outpatient Department    Is the Patient currently in a skilled nursing facility: No     Is the wound work related: No    Procedure date: 2024    Patient Information:     Dx Codes: L97.522    Problem List Items Addressed This Visit          Other    Ulcer of left foot, with fat layer exposed (HCC) - Primary       Advanced Modality Checklist  Is Wound Greater than 1.0 sq cm? : Y (2024  1:26 PM)  Has the Wound had Documented Treatment for 30 Days?: Y (2024  1:26 PM)  Recurrent Wound with Skin Substitute within

## 2024-06-26 ENCOUNTER — HOSPITAL ENCOUNTER (OUTPATIENT)
Facility: HOSPITAL | Age: 69
Discharge: HOME | End: 2024-06-26
Attending: EMERGENCY MEDICINE
Payer: MEDICARE

## 2024-06-26 VITALS
SYSTOLIC BLOOD PRESSURE: 125 MMHG | TEMPERATURE: 98.1 F | DIASTOLIC BLOOD PRESSURE: 60 MMHG | HEART RATE: 70 BPM | RESPIRATION RATE: 16 BRPM

## 2024-06-26 DIAGNOSIS — L97.522 ULCER OF LEFT FOOT, WITH FAT LAYER EXPOSED (HCC): Primary | ICD-10-CM

## 2024-06-26 PROCEDURE — 11102 TANGNTL BX SKIN SINGLE LES: CPT

## 2024-06-26 PROCEDURE — 11106 INCAL BX SKN SINGLE LES: CPT

## 2024-06-26 RX ORDER — BUPIVACAINE HYDROCHLORIDE 5 MG/ML
10 INJECTION, SOLUTION EPIDURAL; INTRACAUDAL ONCE
Status: COMPLETED | OUTPATIENT
Start: 2024-06-26 | End: 2024-06-26

## 2024-06-26 RX ORDER — LIDOCAINE HYDROCHLORIDE 20 MG/ML
JELLY TOPICAL ONCE
OUTPATIENT
Start: 2024-06-26 | End: 2024-06-26

## 2024-06-26 RX ORDER — LIDOCAINE HYDROCHLORIDE 40 MG/ML
SOLUTION TOPICAL ONCE
OUTPATIENT
Start: 2024-06-26 | End: 2024-06-26

## 2024-06-26 RX ORDER — LIDOCAINE 50 MG/G
OINTMENT TOPICAL ONCE
OUTPATIENT
Start: 2024-06-26 | End: 2024-06-26

## 2024-06-26 RX ORDER — TRIAMCINOLONE ACETONIDE 1 MG/G
OINTMENT TOPICAL ONCE
OUTPATIENT
Start: 2024-06-26 | End: 2024-06-26

## 2024-06-26 RX ORDER — LIDOCAINE 40 MG/G
CREAM TOPICAL ONCE
OUTPATIENT
Start: 2024-06-26 | End: 2024-06-26

## 2024-06-26 RX ORDER — IBUPROFEN 200 MG
TABLET ORAL ONCE
OUTPATIENT
Start: 2024-06-26 | End: 2024-06-26

## 2024-06-26 RX ORDER — BACITRACIN ZINC AND POLYMYXIN B SULFATE 500; 1000 [USP'U]/G; [USP'U]/G
OINTMENT TOPICAL ONCE
OUTPATIENT
Start: 2024-06-26 | End: 2024-06-26

## 2024-06-26 RX ORDER — SODIUM CHLOR/HYPOCHLOROUS ACID 0.033 %
SOLUTION, IRRIGATION IRRIGATION ONCE
OUTPATIENT
Start: 2024-06-26 | End: 2024-06-26

## 2024-06-26 RX ORDER — GINSENG 100 MG
CAPSULE ORAL ONCE
OUTPATIENT
Start: 2024-06-26 | End: 2024-06-26

## 2024-06-26 RX ORDER — BETAMETHASONE DIPROPIONATE 0.5 MG/G
CREAM TOPICAL ONCE
OUTPATIENT
Start: 2024-06-26 | End: 2024-06-26

## 2024-06-26 RX ORDER — GENTAMICIN SULFATE 1 MG/G
OINTMENT TOPICAL ONCE
OUTPATIENT
Start: 2024-06-26 | End: 2024-06-26

## 2024-06-26 RX ORDER — CLOBETASOL PROPIONATE 0.5 MG/G
OINTMENT TOPICAL ONCE
OUTPATIENT
Start: 2024-06-26 | End: 2024-06-26

## 2024-06-26 RX ADMIN — BUPIVACAINE HYDROCHLORIDE 50 MG: 5 INJECTION, SOLUTION EPIDURAL; INTRACAUDAL at 09:28

## 2024-06-26 ASSESSMENT — PAIN SCALES - GENERAL: PAINLEVEL_OUTOF10: 0

## 2024-06-26 NOTE — PATIENT INSTRUCTIONS
Discharge Instructions for  [facility]  [address] [place] [address]  Telephone: [phone]   FAX [phone]    NAME:  Yunior Clement  YOB: 1955  ACCOUNT NUMBER :  [id number]  DATE:  6/24/2024     : Steve BERKOWITZ     HOME HEALTH: [facility]    Will send out insurance authorization for skin sub called [employer] to apply to wound - Insurance will not cover       Wound Cleansing:   Do not scrub or use excessive force.  Cleanse wound prior to applying a clean dressing with:      [x] Cleanse wound with: VASHE     [] May Shower at Discharge     [] Shower on days of dressing change, remove dressing first    [] Keep Wound Dry in Shower (may purchase a cast cover if needed)     Topical Treatments:  Do not apply lotions, creams, or ointments to wound bed unless directed.     [] Apply moisturizing lotion A&D ointment  to skin surrounding the wound prior to dressing change.  [] Apply antifungal ointment to skin surrounding the wound prior to dressing change.  [] Apply thin film of Zinc barrier ointment to skin immediately around wound.  [] Other:      Dressings:           Wound Location Left plantar foot   Apply Primary Dressing:      [x] MUPIROCIN OINTMENT then Xeroform gauze over top         Cover and Secure with:    [x] Gauze single piece between toe, gauze, roll gauze, single tubi.   [x] Medipore tape     Avoid contact of tape with skin.    Change dressing:   [] Continue Daily   [x] Every Other Day   [] Three times per week  [] Once a week   [] Do Not Change Dressing     [] Other:    Off-Loading: [x] Surgical shoe (wound clinic to add felt to surgical shoe for offloading)   [] Podus Boot(s)   [] Foam Boot(s)  [] Knee scooter [] Cast Boot [] CROW Boot  [] Other:  [x] Total non-weight bearing : [] Right Leg     [x] Left foot  [x] Off-loading when : [x] walking  [] in bed [] Sitting      Dietary:  [x] Increase Protein: examples ( Meat, cheese, eggs, greek yogurt,

## 2024-06-26 NOTE — FLOWSHEET NOTE
06/26/24 0943   Left Leg Edema Point of Measurement   Compression Therapy Tubular elastic support bandage   Wound 03/19/24 Toe (Comment  which one) Left;Plantar #1 4th   Date First Assessed/Time First Assessed: 03/19/24 0910   Present on Original Admission: Yes  Wound Approximate Age at First Assessment (Weeks): (c)   Primary Wound Type: Other (comment)  Location: (c) Toe (Comment  which one)  Wound Location Orientati...   Dressing Status New dressing applied   Wound Cleansed Vashe   Dressing/Treatment Gauze dressing/dressing sponge;Tape/Soft cloth adhesive tape;Xeroform;Other (comment);Roll gauze  (mupirocin ointment)   Offloading for Diabetic Foot Ulcers Offloading ordered     Discharge Condition: Stable    Pain: 0    Ambulatory Status:Wheelchair    Discharge Destination: Home    Transportation:Car    Accompanied by: Self and Family    Discharge instructions reviewed with Self and Family and copy or written instructions have been provided. All questions/concerns have been addressed at this time.

## 2024-06-26 NOTE — FLOWSHEET NOTE
06/26/24 0908   Anesthetic   Anesthetic 4% Lidocaine Liquid Topical   Left Leg Edema Point of Measurement   Leg circumference 32.3 cm   Ankle circumference 21.5 cm   LLE Neurovascular Assessment   Capillary Refill Less than/Equal to 3 seconds   Color Appropriate for Ethnicity   Temperature Cool   L Pedal Pulse +2   Wound 03/19/24 Toe (Comment  which one) Left;Plantar #1 4th   Date First Assessed/Time First Assessed: 03/19/24 0910   Present on Original Admission: Yes  Wound Approximate Age at First Assessment (Weeks): (c)   Primary Wound Type: Other (comment)  Location: (c) Toe (Comment  which one)  Wound Location Orientati...   Wound Image    Wound Length (cm) 2 cm   Wound Width (cm) 1.3 cm   Wound Depth (cm) 0.2 cm   Wound Surface Area (cm^2) 2.6 cm^2   Change in Wound Size % (l*w) 27.17   Wound Volume (cm^3) 0.52 cm^3   Wound Healing % 27   Wound Assessment [place]/red;Slough   Drainage Amount Moderate (25-50%)   Drainage Description Serosanguinous   Odor None   Yenny-wound Assessment Maceration   Margins Flat/open edges     /60   Pulse 70   Temp 98.1 °F (36.7 °C) (Temporal)   Resp 16

## 2024-06-26 NOTE — PROGRESS NOTES
Wound Size % (l*w) 27.17 06/26/24 0908   Wound Volume (cm^3) 0.52 cm^3 06/26/24 0908   Wound Healing % 27 06/26/24 0908   Post-Procedure Length (cm) 1.5 cm 06/12/24 0923   Post-Procedure Width (cm) 1 cm 06/12/24 0923   Post-Procedure Depth (cm) 0.2 cm 06/12/24 0923   Post-Procedure Surface Area (cm^2) 1.5 cm^2 06/12/24 0923   Post-Procedure Volume (cm^3) 0.3 cm^3 06/12/24 0923   Wound Assessment Pink/red;Slough 06/26/24 0908   Drainage Amount Moderate (25-50%) 06/26/24 0908   Drainage Description Serosanguinous 06/26/24 0908   Odor None 06/26/24 0908   Yenny-wound Assessment Maceration 06/26/24 0908   Margins Flat/open edges 06/26/24 0908   Wound Thickness Description not for Pressure Injury Full thickness 06/12/24 0923   Number of days: 99               I have noted, and reviewed today's data for this patient in [facility] and concur with same. The focused physical exam, other physical findings, Medical history, Review of Symptoms and Medications today remains unchanged except as noted below.     Patient notes today: doing better, less pain, less drainage.  Lesion/Wound, focused exam on Presentation today: note dark area now with brown pigment blush extending posterior from main ulcerated area, open ulcer bed much smaller moist, non tender thin slough over open surface.    Procedure:   Ulcer Type: undetermined  Consent obtained: Yes  Time out taken: Yes    Wound,(s)# nodule L plantar   Procedure name: sharp excisional biopsy.  Anaesthesia: Lidocaine; topical    Description: adding subQ infiltration with 0.5% bupivicaine to area of ulcer and ?nodule. I used #15 blade to excise the mass in sections and retrieve for pathology. I then closed the excision site with multiple interrupted 3-0 nylon  Tissue Level/depth of debridement: subq.  Post debridement dimensions changed as noted:    Depth, add 4.0 mm;    Width, add 06.0 mm   Length, add 010.0 mm.   Blood Loss: 3 CCs. Bleeding abated post treatment .   Post Procedure

## 2024-07-03 ENCOUNTER — HOSPITAL ENCOUNTER (OUTPATIENT)
Facility: HOSPITAL | Age: 69
Discharge: HOME | End: 2024-07-03
Attending: EMERGENCY MEDICINE

## 2024-07-03 VITALS
SYSTOLIC BLOOD PRESSURE: 99 MMHG | DIASTOLIC BLOOD PRESSURE: 65 MMHG | TEMPERATURE: 98.8 F | RESPIRATION RATE: 16 BRPM | HEART RATE: 69 BPM

## 2024-07-03 ASSESSMENT — PAIN SCALES - GENERAL: PAINLEVEL_OUTOF10: 0

## 2024-07-03 NOTE — PROGRESS NOTES
I have asked the patient and family member  to come in today to discuss new pathology report.     Surgical path returns showing nodular melanoma in the subq beneath the skin ulcer L foot. I presented these findings and recommended to proceed to follow up with Cancer Care center of choice, though I encouraged [facility].     Patient and family agree so that we will work to get him to [facility] for care of Melanoma. Will still follow up here for suture removal and wound care post biopsy.

## 2024-07-03 NOTE — PATIENT INSTRUCTIONS
Discharge Instructions for  [facility]  [address] [place] [address]  Telephone: [phone]   FAX [phone]    NAME:  Yunior Clement  YOB: 1955  ACCOUNT NUMBER :  [id number]  DATE:  7/3/2024     : Steve BERKOWITZ     HOME HEALTH: [facility]    Referral to [facility]       Wound Cleansing:   Do not scrub or use excessive force.  Cleanse wound prior to applying a clean dressing with:      [x] Cleanse wound with: VASHE     [] May Shower at Discharge     [] Shower on days of dressing change, remove dressing first    [] Keep Wound Dry in Shower (may purchase a cast cover if needed)     Topical Treatments:  Do not apply lotions, creams, or ointments to wound bed unless directed.     [] Apply moisturizing lotion A&D ointment  to skin surrounding the wound prior to dressing change.  [] Apply antifungal ointment to skin surrounding the wound prior to dressing change.  [] Apply thin film of Zinc barrier ointment to skin immediately around wound.  [] Other:      Dressings:           Wound Location Left plantar foot   Apply Primary Dressing:      [x] MUPIROCIN OINTMENT then Xeroform gauze over top         Cover and Secure with:    [x] Gauze single piece between toe, gauze, roll gauze, single tubi.   [x] Medipore tape     Avoid contact of tape with skin.    Change dressing:   [] Continue Daily   [x] Every Other Day   [] Three times per week  [] Once a week   [] Do Not Change Dressing     [] Other:    Off-Loading: [x] Surgical shoe (wound clinic to add felt to surgical shoe for offloading)   [] Podus Boot(s)   [] Foam Boot(s)  [] Knee scooter [] Cast Boot [] CROW Boot  [] Other:  [x] Total non-weight bearing : [] Right Leg     [x] Left foot  [x] Off-loading when : [x] walking  [] in bed [] Sitting      Dietary:  [x] Increase Protein: examples ( Meat, cheese, eggs, greek yogurt, premier protein drink, fish, nuts )   [] Kamlesh  [] Protien drink supplement

## 2024-07-16 ENCOUNTER — TELEPHONE (OUTPATIENT)
Facility: HOSPITAL | Age: 69
End: 2024-07-16

## 2024-07-16 NOTE — TELEPHONE ENCOUNTER
Received VM from a female of the name [place] requesting to cancel patient appointment. She is not on patient's PHI. LVM for daughter to call office to confirm cancellation of patient's appointment

## 2024-07-24 ENCOUNTER — HOSPITAL ENCOUNTER (OUTPATIENT)
Facility: HOSPITAL | Age: 69
Discharge: HOME | End: 2024-07-24
Attending: EMERGENCY MEDICINE
Payer: MEDICARE

## 2024-07-24 VITALS
DIASTOLIC BLOOD PRESSURE: 63 MMHG | RESPIRATION RATE: 15 BRPM | HEART RATE: 83 BPM | TEMPERATURE: 98.2 F | SYSTOLIC BLOOD PRESSURE: 134 MMHG

## 2024-07-24 DIAGNOSIS — C43.72: ICD-10-CM

## 2024-07-24 DIAGNOSIS — L97.522 ULCER OF LEFT FOOT, WITH FAT LAYER EXPOSED (HCC): Primary | ICD-10-CM

## 2024-07-24 PROCEDURE — 97597 DBRDMT OPN WND 1ST 20 CM/<: CPT

## 2024-07-24 RX ORDER — GENTAMICIN SULFATE 1 MG/G
OINTMENT TOPICAL ONCE
OUTPATIENT
Start: 2024-07-24 | End: 2024-07-24

## 2024-07-24 RX ORDER — SODIUM CHLOR/HYPOCHLOROUS ACID 0.033 %
SOLUTION, IRRIGATION IRRIGATION ONCE
OUTPATIENT
Start: 2024-07-24 | End: 2024-07-24

## 2024-07-24 RX ORDER — LIDOCAINE HYDROCHLORIDE 20 MG/ML
JELLY TOPICAL ONCE
OUTPATIENT
Start: 2024-07-24 | End: 2024-07-24

## 2024-07-24 RX ORDER — BETAMETHASONE DIPROPIONATE 0.5 MG/G
CREAM TOPICAL ONCE
OUTPATIENT
Start: 2024-07-24 | End: 2024-07-24

## 2024-07-24 RX ORDER — LIDOCAINE 40 MG/G
CREAM TOPICAL ONCE
OUTPATIENT
Start: 2024-07-24 | End: 2024-07-24

## 2024-07-24 RX ORDER — CLOBETASOL PROPIONATE 0.5 MG/G
OINTMENT TOPICAL ONCE
OUTPATIENT
Start: 2024-07-24 | End: 2024-07-24

## 2024-07-24 RX ORDER — IBUPROFEN 200 MG
TABLET ORAL ONCE
OUTPATIENT
Start: 2024-07-24 | End: 2024-07-24

## 2024-07-24 RX ORDER — BACITRACIN ZINC AND POLYMYXIN B SULFATE 500; 1000 [USP'U]/G; [USP'U]/G
OINTMENT TOPICAL ONCE
OUTPATIENT
Start: 2024-07-24 | End: 2024-07-24

## 2024-07-24 RX ORDER — GINSENG 100 MG
CAPSULE ORAL ONCE
OUTPATIENT
Start: 2024-07-24 | End: 2024-07-24

## 2024-07-24 RX ORDER — TRIAMCINOLONE ACETONIDE 1 MG/G
OINTMENT TOPICAL ONCE
OUTPATIENT
Start: 2024-07-24 | End: 2024-07-24

## 2024-07-24 RX ORDER — LIDOCAINE 50 MG/G
OINTMENT TOPICAL ONCE
OUTPATIENT
Start: 2024-07-24 | End: 2024-07-24

## 2024-07-24 RX ORDER — LIDOCAINE HYDROCHLORIDE 40 MG/ML
SOLUTION TOPICAL ONCE
OUTPATIENT
Start: 2024-07-24 | End: 2024-07-24

## 2024-07-24 NOTE — FLOWSHEET NOTE
07/24/24 0909   Left Leg Edema Point of Measurement   Leg circumference 32 cm   Ankle circumference 22 cm   LLE Neurovascular Assessment   Capillary Refill Less than/Equal to 3 seconds   Color Appropriate for Ethnicity   Temperature Warm   L Pedal Pulse +1   Wound 03/19/24 Toe (Comment  which one) Left;Plantar #1 4th   Date First Assessed/Time First Assessed: 03/19/24 0910   Present on Original Admission: Yes  Wound Approximate Age at First Assessment (Weeks): (c)   Primary Wound Type: Other (comment)  Location: (c) Toe (Comment  which one)  Wound Location Orientati...   Wound Image    Dressing Status Old drainage noted   Wound Length (cm) 2 cm   Wound Width (cm) 0.1 cm   Wound Depth (cm) 0 cm   Wound Surface Area (cm^2) 0.2 cm^2   Change in Wound Size % (l*w) 94.4   Wound Volume (cm^3) 0 cm^3   Wound Healing % 100   Wound Assessment   (dried exudate)   Drainage Amount None (dry)   Odor None   Yenny-wound Assessment Intact   Margins Attached edges     /63   Pulse 83   Temp 98.2 °F (36.8 °C) (Temporal)   Resp 15

## 2024-07-24 NOTE — FLOWSHEET NOTE
07/24/24 0933   Wound 03/19/24 Toe (Comment  which one) Left;Plantar #1 4th   Date First Assessed/Time First Assessed: 03/19/24 0910   Present on Original Admission: Yes  Wound Approximate Age at First Assessment (Weeks): (c)   Primary Wound Type: Other (comment)  Location: (c) Toe (Comment  which one)  Wound Location Orientati...   Wound Image   (post debridement/suture removal)   Dressing/Treatment Xeroform;Gauze dressing/dressing sponge;Tape change     Discharge Condition: Stable    Pain: 0    Ambulatory Status:Wheelchair    Discharge Destination: Home    Transportation:Car    Accompanied by: Self and family    Discharge instructions reviewed with Self and family and copy or written instructions have been provided. All questions/concerns have been addressed at this time.

## 2024-07-24 NOTE — PROGRESS NOTES
[facility]   Progress Note and Procedure Note   NO Procedure    Patient Name: Yunior Clement  : 1955  MRN: [id number]    Past Medical History:   Diagnosis Date    Cerebral artery occlusion with cerebral infarction (HCC)     Hypertension      History reviewed. No pertinent surgical history.  Family History   Problem Relation Age of Onset    Heart Failure Mother      Social History     Tobacco Use    Smoking status: Never    Smokeless tobacco: Never   Substance Use Topics    Alcohol use: Yes     Comment: social    Drug use: Never     No Known Allergies  Current Outpatient Medications on File Prior to Encounter   Medication Sig Dispense Refill    furosemide (LASIX) 20 MG tablet Take 1 tablet by mouth      potassium chloride (KLOR-CON) 20 MEQ packet Take 20 mEq by mouth daily      lisinopril-hydroCHLOROthiazide (PRINZIDE;ZESTORETIC) 20-25 MG per tablet Take 1 tablet by mouth daily      metoprolol tartrate (LOPRESSOR) 50 MG tablet Take 1 tablet by mouth 2 times daily      atorvastatin (LIPITOR) 40 MG tablet Take 1 tablet by mouth nightly       No current facility-administered medications on file prior to encounter.     Lab Results   Component Value Date/Time    LABA1C 5.3 2024 09:59 AM         Vitals:    24 0900   BP: 134/63   Pulse: 83   Resp: 15   Temp: 98.2 °F (36.8 °C)      Wound 24 Toe (Comment  which one) Left;Plantar #1 4th (Active)   Wound Image   24 0933   Wound Etiology Venous 24 0926   Dressing Status Old drainage noted 24 0909   Wound Cleansed Vashe 24 0943   Dressing/Treatment Xeroform;Gauze dressing/dressing sponge;Tape change 24 0933   Offloading for Diabetic Foot Ulcers Offloading ordered 24 0943   Wound Length (cm) 2 cm 24 0909   Wound Width (cm) 0.1 cm 24 0909   Wound Depth (cm) 0 cm 24 0909   Wound Surface Area (cm^2) 0.2 cm^2 24 0909   Change in Wound Size % (l*w) 94.4 24 0909   Wound

## 2024-07-24 NOTE — PATIENT INSTRUCTIONS
Discharge Instructions for  [facility]  [address] [place] [address]  Telephone: [phone]   FAX [phone]    NAME:  Yunior Clement  YOB: 1955  ACCOUNT NUMBER :  [id number]  DATE:  7/3/2024     : Steve BERKOWITZ     HOME HEALTH: [facility]    Referral to [facility]     Wound Cleansing:   Do not scrub or use excessive force.  Cleanse wound prior to applying a clean dressing with:      [] Cleanse wound with: VASHE     [x] May Shower at Discharge     [] Shower on days of dressing change, remove dressing first    [] Keep Wound Dry in Shower (may purchase a cast cover if needed)       Dressings:           Wound Location Left plantar foot   Apply Primary Dressing:       Xeroform gauze tape  in clinic, then just protect at this point        Off-Loading: [x] Surgical shoe (wound clinic to add felt to surgical shoe for offloading)   [] Podus Boot(s)   [] Foam Boot(s)  [] Knee scooter [] Cast Boot [] CROW Boot  [] Other:  [x] Total non-weight bearing : [] Right Leg     [x] Left foot  [x] Off-loading when : [x] walking  [] in bed [] Sitting      Dietary:  [x] Increase Protein: examples ( Meat, cheese, eggs, greek yogurt, premier protein drink, fish, nuts )   [] Kamlesh  [] Protien drink supplement   [] Recommended Supplements :                        [facility] THANK YOU    Your treatment has been completed at [facility] outpatient wound clinic on 7/24/2024, per Dr. Andersen.    We know patients have several options when choosing a health care provider. We would like to express our sincere appreciation for having had the chance to be yours. More importantly, we enjoy having you as part of our family.     We also hope your experience with us has surpassed your expectations and that you have been pleased with our service. We appreciate the confidence you've shown in selecting us as your health care provider and we will continue or commitment to provide the highest

## 2024-07-25 ENCOUNTER — FOLLOWUP TELEPHONE ENCOUNTER (OUTPATIENT)
Facility: HOSPITAL | Age: 69
End: 2024-07-25

## 2024-07-25 NOTE — TELEPHONE ENCOUNTER
Called [facility] to follow up on referral called to and faxed on 7/8/2024. Per the new patient coordinator the information is with the Physician for review and then patient will be called to set up appt. Informed them to please contact patients daughter Simi who they have on file for emergency contact when they call to set up appt.